# Patient Record
Sex: FEMALE | Race: ASIAN | Employment: PART TIME | URBAN - METROPOLITAN AREA
[De-identification: names, ages, dates, MRNs, and addresses within clinical notes are randomized per-mention and may not be internally consistent; named-entity substitution may affect disease eponyms.]

---

## 2020-10-14 ENCOUNTER — HOSPITAL ENCOUNTER (INPATIENT)
Age: 35
LOS: 3 days | Discharge: HOME | DRG: 663 | End: 2020-10-17
Attending: EMERGENCY MEDICINE | Admitting: EMERGENCY MEDICINE
Payer: MEDICAID

## 2020-10-14 DIAGNOSIS — D64.9 ANEMIA, UNSPECIFIED TYPE: Primary | ICD-10-CM

## 2020-10-14 LAB
ALBUMIN SERPL-MCNC: 3.6 G/DL (ref 3.5–5)
ALBUMIN/GLOB SERPL: 0.8 {RATIO} (ref 1.1–2.2)
ALP SERPL-CCNC: 90 U/L (ref 45–117)
ALT SERPL-CCNC: 45 U/L (ref 12–78)
ANION GAP SERPL CALC-SCNC: 7 MMOL/L (ref 5–15)
ANION GAP SERPL CALC-SCNC: 9 MMOL/L (ref 5–15)
APPEARANCE UR: CLEAR
APTT PPP: <20 SEC (ref 24.2–31.1)
AST SERPL W P-5'-P-CCNC: 52 U/L (ref 15–37)
BACTERIA URNS QL MICRO: NEGATIVE /HPF
BASOPHILS # BLD: 0 K/UL (ref 0–0.1)
BASOPHILS NFR BLD: 0 % (ref 0–1)
BILIRUB SERPL-MCNC: 0.6 MG/DL (ref 0.2–1)
BILIRUB UR QL: NEGATIVE
BUN SERPL-MCNC: 6 MG/DL (ref 6–20)
BUN SERPL-MCNC: 9 MG/DL (ref 6–20)
BUN/CREAT SERPL: 11 (ref 12–20)
BUN/CREAT SERPL: 17 (ref 12–20)
CA-I BLD-MCNC: 8.5 MG/DL (ref 8.5–10.1)
CA-I BLD-MCNC: 8.6 MG/DL (ref 8.5–10.1)
CHLORIDE SERPL-SCNC: 100 MMOL/L (ref 97–108)
CHLORIDE SERPL-SCNC: 106 MMOL/L (ref 97–108)
CO2 SERPL-SCNC: 25 MMOL/L (ref 21–32)
CO2 SERPL-SCNC: 26 MMOL/L (ref 21–32)
COLOR UR: ABNORMAL
CREAT SERPL-MCNC: 0.53 MG/DL (ref 0.55–1.02)
CREAT SERPL-MCNC: 0.54 MG/DL (ref 0.55–1.02)
DIFFERENTIAL METHOD BLD: ABNORMAL
EOSINOPHIL # BLD: 0 K/UL (ref 0–0.4)
EOSINOPHIL NFR BLD: 0 % (ref 0–7)
EPITH CASTS URNS QL MICRO: ABNORMAL /LPF
ERYTHROCYTE [DISTWIDTH] IN BLOOD BY AUTOMATED COUNT: 21.4 % (ref 11.5–14.5)
GLOBULIN SER CALC-MCNC: 4.3 G/DL (ref 2–4)
GLUCOSE SERPL-MCNC: 86 MG/DL (ref 65–100)
GLUCOSE SERPL-MCNC: 91 MG/DL (ref 65–100)
GLUCOSE UR STRIP.AUTO-MCNC: NEGATIVE MG/DL
HCG UR QL: NEGATIVE
HCT VFR BLD AUTO: 10.1 % (ref 35–47)
HGB BLD-MCNC: 2.5 G/DL (ref 11.5–16)
HGB UR QL STRIP: NEGATIVE
IMM GRANULOCYTES # BLD AUTO: 0 K/UL
IMM GRANULOCYTES NFR BLD AUTO: 0 %
INR PPP: 1 (ref 0.9–1.1)
KETONES UR QL STRIP.AUTO: NEGATIVE MG/DL
LEUKOCYTE ESTERASE UR QL STRIP.AUTO: NEGATIVE
LYMPHOCYTES # BLD: 1.2 K/UL (ref 0.8–3.5)
LYMPHOCYTES NFR BLD: 34 % (ref 12–49)
MCH RBC QN AUTO: 12.3 PG (ref 26–34)
MCHC RBC AUTO-ENTMCNC: 24.8 G/DL (ref 30–36.5)
MCV RBC AUTO: 49.5 FL (ref 80–99)
MONOCYTES # BLD: 0.4 K/UL (ref 0–1)
MONOCYTES NFR BLD: 11 % (ref 5–13)
NEUTS SEG # BLD: 1.8 K/UL (ref 1.8–8)
NEUTS SEG NFR BLD: 55 % (ref 32–75)
NITRITE UR QL STRIP.AUTO: NEGATIVE
PH UR STRIP: 7 [PH] (ref 5–8)
PLATELET # BLD AUTO: 405 K/UL (ref 150–400)
PMV BLD AUTO: 8.2 FL (ref 8.9–12.9)
POTASSIUM SERPL-SCNC: 3.1 MMOL/L (ref 3.5–5.1)
POTASSIUM SERPL-SCNC: 3.9 MMOL/L (ref 3.5–5.1)
PROT SERPL-MCNC: 7.9 G/DL (ref 6.4–8.2)
PROT UR STRIP-MCNC: NEGATIVE MG/DL
PROTHROMBIN TIME: 10.3 SEC (ref 9–11.1)
RBC # BLD AUTO: 2.04 M/UL (ref 3.8–5.2)
RBC #/AREA URNS HPF: ABNORMAL /HPF (ref 0–5)
RBC MORPH BLD: ABNORMAL
SODIUM SERPL-SCNC: 134 MMOL/L (ref 136–145)
SODIUM SERPL-SCNC: 139 MMOL/L (ref 136–145)
SP GR UR REFRACTOMETRY: 1 (ref 1–1.03)
THERAPEUTIC RANGE,PTTT: ABNORMAL SEC (ref 68–109)
UROBILINOGEN UR QL STRIP.AUTO: 0.1 EU/DL (ref 0.2–1)
WBC # BLD AUTO: 3.4 K/UL (ref 3.6–11)
WBC URNS QL MICRO: ABNORMAL /HPF (ref 0–4)

## 2020-10-14 PROCEDURE — 80048 BASIC METABOLIC PNL TOTAL CA: CPT

## 2020-10-14 PROCEDURE — 81001 URINALYSIS AUTO W/SCOPE: CPT

## 2020-10-14 PROCEDURE — 86923 COMPATIBILITY TEST ELECTRIC: CPT

## 2020-10-14 PROCEDURE — 36415 COLL VENOUS BLD VENIPUNCTURE: CPT

## 2020-10-14 PROCEDURE — 85610 PROTHROMBIN TIME: CPT

## 2020-10-14 PROCEDURE — 85025 COMPLETE CBC W/AUTO DIFF WBC: CPT

## 2020-10-14 PROCEDURE — 36430 TRANSFUSION BLD/BLD COMPNT: CPT

## 2020-10-14 PROCEDURE — P9016 RBC LEUKOCYTES REDUCED: HCPCS

## 2020-10-14 PROCEDURE — 65270000029 HC RM PRIVATE

## 2020-10-14 PROCEDURE — 81025 URINE PREGNANCY TEST: CPT

## 2020-10-14 PROCEDURE — 80053 COMPREHEN METABOLIC PANEL: CPT

## 2020-10-14 PROCEDURE — 86900 BLOOD TYPING SEROLOGIC ABO: CPT

## 2020-10-14 PROCEDURE — 85730 THROMBOPLASTIN TIME PARTIAL: CPT

## 2020-10-14 PROCEDURE — 99284 EMERGENCY DEPT VISIT MOD MDM: CPT

## 2020-10-14 PROCEDURE — 30233N1 TRANSFUSION OF NONAUTOLOGOUS RED BLOOD CELLS INTO PERIPHERAL VEIN, PERCUTANEOUS APPROACH: ICD-10-PCS | Performed by: INTERNAL MEDICINE

## 2020-10-14 RX ORDER — SODIUM CHLORIDE 9 MG/ML
250 INJECTION, SOLUTION INTRAVENOUS AS NEEDED
Status: DISCONTINUED | OUTPATIENT
Start: 2020-10-14 | End: 2020-10-15

## 2020-10-14 NOTE — ED TRIAGE NOTES
Pt c/o short episodes of dizziness, SOB, anxiousness that last a few minutes x1 yr. Denies any of these symptoms now, but states \"it has happened twice earlier today\". Denies CP, HA,, neuro, other associated symptoms.

## 2020-10-14 NOTE — ED PROVIDER NOTES
EMERGENCY DEPARTMENT HISTORY AND PHYSICAL EXAM      Date: 10/14/2020  Patient Name: Alison Foote      History of Presenting Illness     Chief Complaint   Patient presents with    Shortness of Breath    Dizziness       History Provided By: Patient    HPI: Alison Foote, 38 y.o. female with a past medical history significant No significant past medical history presents to the ED with cc of weakness and sob. States for the last year she has episodes where she has palpitations and dizziness which usually resolve after a period of rest.  Last 2 days, she has noted increasing symptoms and states that she has trouble walking a short distance without feeling extremely short of breath. She denies any chest pain, fever cough or cold symptoms, nausea vomiting diarrhea, melena or hematochezia. PCP: Casa Patel MD        Past History     Past Medical History: Patient endorses a history of anemia. States she required a blood transfusion twice in the past and was referred to hematology but she did not follow-up as she was afraid. No past medical history on file. Past Surgical History:  No past surgical history on file. Family History:  No family history on file. Social History:  Social History     Tobacco Use    Smoking status: Not on file   Substance Use Topics    Alcohol use: Not on file    Drug use: Not on file       Allergies:  No Known Allergies      Review of Systems     Review of Systems   Constitutional: Negative for activity change and appetite change. HENT: Negative for sore throat. Respiratory: Negative for cough. Cardiovascular: Negative for chest pain. Gastrointestinal: Negative for abdominal pain. Genitourinary: Negative for menstrual problem. Neurological: Negative for weakness and numbness. All other systems reviewed and are negative. Physical Exam     Physical Exam  Vitals signs and nursing note reviewed. Constitutional:       Appearance: Normal appearance.  She is not ill-appearing. HENT:      Head: Normocephalic and atraumatic. Nose: Nose normal.      Mouth/Throat:      Mouth: Mucous membranes are moist.      Pharynx: No oropharyngeal exudate or posterior oropharyngeal erythema. Eyes:      General: No scleral icterus. Extraocular Movements: Extraocular movements intact. Pupils: Pupils are equal, round, and reactive to light. Neck:      Musculoskeletal: Normal range of motion and neck supple. Cardiovascular:      Rate and Rhythm: Normal rate and regular rhythm. Pulses: Normal pulses. Heart sounds: Murmur present. Comments: Exam reveals regular rate and rhythm grade 2 out of 6 systolic murmur at the left sternal border  Pulmonary:      Effort: Pulmonary effort is normal.      Breath sounds: Normal breath sounds. No wheezing, rhonchi or rales. Abdominal:      General: Bowel sounds are normal.      Palpations: Abdomen is soft. Tenderness: There is no abdominal tenderness. Musculoskeletal: Normal range of motion. Right lower leg: No edema. Left lower leg: No edema. Skin:     General: Skin is warm and dry. Findings: No rash. Neurological:      General: No focal deficit present. Mental Status: She is alert and oriented to person, place, and time.    Psychiatric:         Mood and Affect: Mood normal.         Behavior: Behavior normal.         Diagnostic Study Results     Labs -     Recent Results (from the past 12 hour(s))   URINALYSIS W/MICROSCOPIC    Collection Time: 10/14/20  4:10 PM   Result Value Ref Range    Color Yellow/Straw      Appearance Clear Clear      Specific gravity 1.005 1.003 - 1.030      pH (UA) 7.0 5.0 - 8.0      Protein Negative Negative mg/dL    Glucose Negative Negative mg/dL    Ketone Negative Negative mg/dL    Bilirubin Negative Negative      Blood Negative Negative      Urobilinogen 0.1 (L) 0.2 - 1.0 EU/dL    Nitrites Negative Negative      Leukocyte Esterase Negative Negative      WBC 0-4 0 - 4 /hpf    RBC 0-5 0 - 5 /hpf    Epithelial cells Few Few /lpf    Bacteria Negative Negative /hpf   METABOLIC PANEL, COMPREHENSIVE    Collection Time: 10/14/20  4:15 PM   Result Value Ref Range    Sodium 134 (L) 136 - 145 mmol/L    Potassium 3.9 3.5 - 5.1 mmol/L    Chloride 100 97 - 108 mmol/L    CO2 25 21 - 32 mmol/L    Anion gap 9 5 - 15 mmol/L    Glucose 91 65 - 100 mg/dL    BUN 9 6 - 20 mg/dL    Creatinine 0.53 (L) 0.55 - 1.02 mg/dL    BUN/Creatinine ratio 17 12 - 20      GFR est AA >60 >60 ml/min/1.73m2    GFR est non-AA >60 >60 ml/min/1.73m2    Calcium 8.5 8.5 - 10.1 mg/dL    Bilirubin, total 0.6 0.2 - 1.0 mg/dL    AST (SGOT) 52 (H) 15 - 37 U/L    ALT (SGPT) 45 12 - 78 U/L    Alk. phosphatase 90 45 - 117 U/L    Protein, total 7.9 6.4 - 8.2 g/dL    Albumin 3.6 3.5 - 5.0 g/dL    Globulin 4.3 (H) 2.0 - 4.0 g/dL    A-G Ratio 0.8 (L) 1.1 - 2.2     PROTHROMBIN TIME + INR    Collection Time: 10/14/20  4:15 PM   Result Value Ref Range    Prothrombin time 10.3 9.0 - 11.1 sec    INR 1.0 0.9 - 1.1     PTT    Collection Time: 10/14/20  4:15 PM   Result Value Ref Range    aPTT <20.0 (L) 24.2 - 31.1 sec    aPTT, therapeutic range   68 - 109 sec   HCG URINE, QL    Collection Time: 10/14/20  4:30 PM   Result Value Ref Range    HCG urine, QL Negative Negative         Radiologic Studies -   [unfilled]  CT Results  (Last 48 hours)    None        CXR Results  (Last 48 hours)    None          Medical Decision Making and ED Course   I am the first provider for this patient. I reviewed the vital signs, available nursing notes, past medical history, past surgical history, family history and social history. Vital Signs-Reviewed the patient's vital signs. Patient Vitals for the past 12 hrs:   Temp Pulse Resp BP SpO2   10/14/20 1400 98.2 °F (36.8 °C) 98 18 (!) 147/88 100 %       EKG interpretation: (Preliminary)  Rhythm: normal sinus rhythm; and regular .  Rate (approx.): 97; Axis: normal; NH interval: normal; QRS interval: normal ; ST/T wave: normal; Other findings: normal.      Records Reviewed: Nursing Notes    The patient presents with dizziness with a differential diagnosis of  dizziness/vertigo, vasovagal episode and anemia, anxiety      Provider Notes (Medical Decision Making):     MDM  Number of Diagnoses or Management Options  Anemia, unspecified type: new, needed workup     Amount and/or Complexity of Data Reviewed  Clinical lab tests: ordered and reviewed    Complete blood count was not done on this patient at this facility as technician states the blood is too thin for the machine read to read it. A peripheral smear was noted for very few microcytic red blood cells and poikilocytes. Suspect severe anemia in this pt. ED Course:   Initial assessment performed. The patients presenting problems have been discussed, and they are in agreement with the care plan formulated and outlined with them. I have encouraged them to ask questions as they arise throughout their visit. CONSULTATIONS: Discussed with Dr. Mariana Cline who agrees to admission for this patient. Disposition       Admitted      Diagnosis     Clinical Impression:   1. Anemia, unspecified type        Attestations:    Deo Dueñas MD    Please note that this dictation was completed with [employer], the computer voice recognition software. Quite often unanticipated grammatical, syntax, homophones, and other interpretive errors are inadvertently transcribed by the computer software. Please disregard these errors. Please excuse any errors that have escaped final proofreading. Thank you.

## 2020-10-15 LAB
ALBUMIN SERPL-MCNC: 3.8 G/DL (ref 3.5–5)
ALBUMIN/GLOB SERPL: 1 {RATIO} (ref 1.1–2.2)
ALP SERPL-CCNC: 91 U/L (ref 45–117)
ALT SERPL-CCNC: 60 U/L (ref 12–78)
ANION GAP SERPL CALC-SCNC: 9 MMOL/L (ref 5–15)
AST SERPL W P-5'-P-CCNC: 63 U/L (ref 15–37)
BILIRUB SERPL-MCNC: 0.8 MG/DL (ref 0.2–1)
BUN SERPL-MCNC: 7 MG/DL (ref 6–20)
BUN/CREAT SERPL: 8 (ref 12–20)
CA-I BLD-MCNC: 8.6 MG/DL (ref 8.5–10.1)
CHLORIDE SERPL-SCNC: 106 MMOL/L (ref 97–108)
CO2 SERPL-SCNC: 24 MMOL/L (ref 21–32)
CREAT SERPL-MCNC: 0.85 MG/DL (ref 0.55–1.02)
GLOBULIN SER CALC-MCNC: 3.9 G/DL (ref 2–4)
GLUCOSE SERPL-MCNC: 102 MG/DL (ref 65–100)
HCT VFR BLD AUTO: 19.3 % (ref 35–47)
HGB BLD-MCNC: 5.6 G/DL (ref 11.5–16)
LDH SERPL L TO P-CCNC: 170 U/L (ref 81–246)
POTASSIUM SERPL-SCNC: 3.5 MMOL/L (ref 3.5–5.1)
PROT SERPL-MCNC: 7.7 G/DL (ref 6.4–8.2)
SODIUM SERPL-SCNC: 139 MMOL/L (ref 136–145)
TSH SERPL DL<=0.05 MIU/L-ACNC: 0.74 UIU/ML (ref 0.36–3.74)

## 2020-10-15 PROCEDURE — 36415 COLL VENOUS BLD VENIPUNCTURE: CPT

## 2020-10-15 PROCEDURE — 74011000258 HC RX REV CODE- 258: Performed by: INTERNAL MEDICINE

## 2020-10-15 PROCEDURE — 82746 ASSAY OF FOLIC ACID SERUM: CPT

## 2020-10-15 PROCEDURE — 82607 VITAMIN B-12: CPT

## 2020-10-15 PROCEDURE — 83615 LACTATE (LD) (LDH) ENZYME: CPT

## 2020-10-15 PROCEDURE — 74011250636 HC RX REV CODE- 250/636: Performed by: INTERNAL MEDICINE

## 2020-10-15 PROCEDURE — C9113 INJ PANTOPRAZOLE SODIUM, VIA: HCPCS | Performed by: INTERNAL MEDICINE

## 2020-10-15 PROCEDURE — 84443 ASSAY THYROID STIM HORMONE: CPT

## 2020-10-15 PROCEDURE — 65270000029 HC RM PRIVATE

## 2020-10-15 PROCEDURE — 74011250637 HC RX REV CODE- 250/637: Performed by: INTERNAL MEDICINE

## 2020-10-15 PROCEDURE — 80053 COMPREHEN METABOLIC PANEL: CPT

## 2020-10-15 PROCEDURE — 83010 ASSAY OF HAPTOGLOBIN QUANT: CPT

## 2020-10-15 PROCEDURE — P9016 RBC LEUKOCYTES REDUCED: HCPCS

## 2020-10-15 PROCEDURE — 36430 TRANSFUSION BLD/BLD COMPNT: CPT

## 2020-10-15 PROCEDURE — 83540 ASSAY OF IRON: CPT

## 2020-10-15 PROCEDURE — 85045 AUTOMATED RETICULOCYTE COUNT: CPT

## 2020-10-15 PROCEDURE — 82272 OCCULT BLD FECES 1-3 TESTS: CPT

## 2020-10-15 PROCEDURE — 85018 HEMOGLOBIN: CPT

## 2020-10-15 PROCEDURE — 82728 ASSAY OF FERRITIN: CPT

## 2020-10-15 PROCEDURE — 30233N1 TRANSFUSION OF NONAUTOLOGOUS RED BLOOD CELLS INTO PERIPHERAL VEIN, PERCUTANEOUS APPROACH: ICD-10-PCS | Performed by: INTERNAL MEDICINE

## 2020-10-15 RX ORDER — ACETAMINOPHEN 325 MG/1
650 TABLET ORAL
Status: DISCONTINUED | OUTPATIENT
Start: 2020-10-15 | End: 2020-10-17 | Stop reason: HOSPADM

## 2020-10-15 RX ORDER — SODIUM CHLORIDE 9 MG/ML
250 INJECTION, SOLUTION INTRAVENOUS AS NEEDED
Status: DISCONTINUED | OUTPATIENT
Start: 2020-10-15 | End: 2020-10-17 | Stop reason: HOSPADM

## 2020-10-15 RX ORDER — SODIUM CHLORIDE 0.9 % (FLUSH) 0.9 %
5-40 SYRINGE (ML) INJECTION EVERY 8 HOURS
Status: CANCELLED | OUTPATIENT
Start: 2020-10-15

## 2020-10-15 RX ORDER — SODIUM CHLORIDE 9 MG/ML
250 INJECTION, SOLUTION INTRAVENOUS AS NEEDED
Status: DISCONTINUED | OUTPATIENT
Start: 2020-10-15 | End: 2020-10-15

## 2020-10-15 RX ORDER — SODIUM CHLORIDE 0.9 % (FLUSH) 0.9 %
5-40 SYRINGE (ML) INJECTION AS NEEDED
Status: CANCELLED | OUTPATIENT
Start: 2020-10-15

## 2020-10-15 RX ORDER — SODIUM CHLORIDE 9 MG/ML
75 INJECTION, SOLUTION INTRAVENOUS CONTINUOUS
Status: CANCELLED | OUTPATIENT
Start: 2020-10-15

## 2020-10-15 RX ORDER — LANOLIN ALCOHOL/MO/W.PET/CERES
3 CREAM (GRAM) TOPICAL
Status: DISCONTINUED | OUTPATIENT
Start: 2020-10-15 | End: 2020-10-17 | Stop reason: HOSPADM

## 2020-10-15 RX ORDER — FUROSEMIDE 10 MG/ML
20 INJECTION INTRAMUSCULAR; INTRAVENOUS
Status: COMPLETED | OUTPATIENT
Start: 2020-10-15 | End: 2020-10-15

## 2020-10-15 RX ORDER — FUROSEMIDE 10 MG/ML
20 INJECTION INTRAMUSCULAR; INTRAVENOUS AS NEEDED
Status: COMPLETED | OUTPATIENT
Start: 2020-10-15 | End: 2020-10-16

## 2020-10-15 RX ADMIN — SODIUM CHLORIDE 8 MG/HR: 900 INJECTION INTRAVENOUS at 09:58

## 2020-10-15 RX ADMIN — ACETAMINOPHEN 650 MG: 325 TABLET, FILM COATED ORAL at 11:07

## 2020-10-15 RX ADMIN — SODIUM CHLORIDE 8 MG/HR: 900 INJECTION INTRAVENOUS at 14:20

## 2020-10-15 RX ADMIN — ACETAMINOPHEN 650 MG: 325 TABLET, FILM COATED ORAL at 03:59

## 2020-10-15 RX ADMIN — FUROSEMIDE 20 MG: 10 INJECTION, SOLUTION INTRAMUSCULAR; INTRAVENOUS at 03:59

## 2020-10-15 NOTE — PROGRESS NOTES
CM assessed patient at bedside. Patient was admitted to the hospital for dizziness and shortness of breath. Patient had an RUR score of 7%. Patient reported that she lives in a one story house with her  Hao Stinson [phone]) and her two children ages (15 and 6). Patient has no history of falling, uses no DME, and drives a car. Patient reported that she has no POA and no AMD. Patient reported that she has not had an appointment with her PCP Noman Lees [phone]) in several years.  Patient has received no rehab in the past.    CM will continue to follow patient for discharge planning needs,

## 2020-10-15 NOTE — PROGRESS NOTES
Patient received from [facility]standing ED via ambulance; resting quietly in bed; no distress noted; awaiting lab work; will continue to monitor

## 2020-10-15 NOTE — PROGRESS NOTES
Performed a 2 person skin assessment with Silviano Brower RN  Skin is intact with no issues to report.

## 2020-10-15 NOTE — PROGRESS NOTES
Spoke with Dr. Patrice Moreira concerning number of units of blood patient to receive; also obtained order for diet and medication for headache

## 2020-10-15 NOTE — H&P
[address]   History and Physical    Primary Care Provider: Yvon Kaba MD  Date of Service:  10/15/2020    Subjective:     Kylie Wells is a 38 y.o. female who presents with  dizziness onset of symptoms was gradual with unchanged course since that time. Patient denies any chest pain, denies any cough denies any chills. Had similar episode in the past which improved with patient taking a rest.  Had blood transfusion 8 years ago and had colonoscopy 8 years ago. She denies passing any black stool but occasionally passes bright red blood in stool after taking spicy food. Hemoglobin was low at 2 g  Review of Systems:    A comprehensive review of systems was negative except for that written in the History of Present Illness. No past medical history on file. No past surgical history on file. Prior to Admission medications    Not on File     No Known Allergies   No family history on file. SOCIAL HISTORY:  Patient resides at Home. Patient ambulates with no help. Smoking history: None  Alcohol history:unknown        Objective:       Physical Exam:   Visit Vitals  /69 (BP 1 Location: Left arm, BP Patient Position: At rest)   Pulse 86   Temp 98.7 °F (37.1 °C)   Resp 20   Ht 4' 11\" (1.499 m)   Wt 64.4 kg (142 lb)   SpO2 100%   BMI 28.68 kg/m²     General:  Alert, cooperative, no distress, appears stated age. appears to be pale   Head:  Normocephalic, without obvious abnormality, atraumatic. Eyes:  Conjunctivae/corneas clear. PERRL, EOMs intact. Fundi benign. Ears:  Normal TMs and external ear canals both ears. Nose: Nares normal. Septum midline. Mucosa normal. No drainage or sinus tenderness. Throat: Lips, mucosa, and tongue normal. Teeth and gums normal.   Neck: Supple, symmetrical, trachea midline, no adenopathy, thyroid: no enlargement/tenderness/nodules, no carotid bruit and no JVD. Back:   Symmetric, no curvature. ROM normal. No CVA tenderness.    Lungs:   Clear to auscultation bilaterally. Chest wall:  No tenderness or deformity. Heart:  Regular rate and rhythm, S1, S2 normal, no murmur, click, rub or gallop. Breast Exam:  No tenderness, masses, or nipple abnormality. Abdomen:   Soft, non-tender. Bowel sounds normal. No masses,  No organomegaly. Genitalia:  Normal female without lesion, discharge or tenderness. Rectal:  Normal tone,  no masses or tenderness  Guaiac negative stool. Extremities: Extremities normal, atraumatic, no cyanosis or edema. Pulses: 2+ and symmetric all extremities. Skin: Skin color, texture, turgor normal. No rashes or lesions. L   Lymph nodes: Cervical, supraclavicular, and axillary nodes normal.   Neurologic: CNII-XII intact. Normal strength, sensation and reflexes throughout. Cap refill: Brisk, less than 3 seconds  Pulses: 2+, symmetric in all extremities        Data Review: All diagnostic labs and studies have been reviewed.     Chest x-ray   No orders to display     Recent Results (from the past 24 hour(s))   URINALYSIS W/MICROSCOPIC    Collection Time: 10/14/20  4:10 PM   Result Value Ref Range    Color Yellow/Straw      Appearance Clear Clear      Specific gravity 1.005 1.003 - 1.030      pH (UA) 7.0 5.0 - 8.0      Protein Negative Negative mg/dL    Glucose Negative Negative mg/dL    Ketone Negative Negative mg/dL    Bilirubin Negative Negative      Blood Negative Negative      Urobilinogen 0.1 (L) 0.2 - 1.0 EU/dL    Nitrites Negative Negative      Leukocyte Esterase Negative Negative      WBC 0-4 0 - 4 /hpf    RBC 0-5 0 - 5 /hpf    Epithelial cells Few Few /lpf    Bacteria Negative Negative /hpf   METABOLIC PANEL, COMPREHENSIVE    Collection Time: 10/14/20  4:15 PM   Result Value Ref Range    Sodium 134 (L) 136 - 145 mmol/L    Potassium 3.9 3.5 - 5.1 mmol/L    Chloride 100 97 - 108 mmol/L    CO2 25 21 - 32 mmol/L    Anion gap 9 5 - 15 mmol/L    Glucose 91 65 - 100 mg/dL    BUN 9 6 - 20 mg/dL    Creatinine 0.53 (L) 0.55 - 1.02 mg/dL BUN/Creatinine ratio 17 12 - 20      GFR est AA >60 >60 ml/min/1.73m2    GFR est non-AA >60 >60 ml/min/1.73m2    Calcium 8.5 8.5 - 10.1 mg/dL    Bilirubin, total 0.6 0.2 - 1.0 mg/dL    AST (SGOT) 52 (H) 15 - 37 U/L    ALT (SGPT) 45 12 - 78 U/L    Alk. phosphatase 90 45 - 117 U/L    Protein, total 7.9 6.4 - 8.2 g/dL    Albumin 3.6 3.5 - 5.0 g/dL    Globulin 4.3 (H) 2.0 - 4.0 g/dL    A-G Ratio 0.8 (L) 1.1 - 2.2     PROTHROMBIN TIME + INR    Collection Time: 10/14/20  4:15 PM   Result Value Ref Range    Prothrombin time 10.3 9.0 - 11.1 sec    INR 1.0 0.9 - 1.1     PTT    Collection Time: 10/14/20  4:15 PM   Result Value Ref Range    aPTT <20.0 (L) 24.2 - 31.1 sec    aPTT, therapeutic range   68 - 109 sec   HCG URINE, QL    Collection Time: 10/14/20  4:30 PM   Result Value Ref Range    HCG urine, QL Negative Negative     TYPE & SCREEN    Collection Time: 10/14/20  9:15 PM   Result Value Ref Range    Crossmatch Expiration 10/17/2020,2359     ABO/Rh(D) O Positive     Antibody screen Negative     Unit number [id number]     Blood component type  LR,2     Unit division 00     Status of unit Allocated     TRANSFUSION STATUS Ok to transfuse     Crossmatch result Compatible     Unit number [id number]     Blood component type  LR     Unit division 00     Status of unit [address] to transfuse     Crossmatch result Compatible    CBC WITH AUTOMATED DIFF    Collection Time: 10/14/20  9:16 PM   Result Value Ref Range    WBC 3.4 (L) 3.6 - 11.0 K/uL    RBC 2.04 (L) 3.80 - 5.20 M/uL    HGB 2.5 (LL) 11.5 - 16.0 g/dL    HCT 10.1 (LL) 35.0 - 47.0 %    MCV 49.5 (L) 80.0 - 99.0 FL    MCH 12.3 (L) 26.0 - 34.0 PG    MCHC 24.8 (L) 30.0 - 36.5 g/dL    RDW 21.4 (H) 11.5 - 14.5 %    PLATELET 652 (H) 221 - 400 K/uL    MPV 8.2 (L) 8.9 - 12.9 FL    NEUTROPHILS 55 32 - 75 %    LYMPHOCYTES 34 12 - 49 %    MONOCYTES 11 5 - 13 %    EOSINOPHILS 0 0 - 7 %    BASOPHILS 0 0 - 1 %    IMMATURE GRANULOCYTES 0 %    ABS. NEUTROPHILS 1.8 1.8 - 8.0 K/UL    ABS. LYMPHOCYTES 1.2 0.8 - 3.5 K/UL    ABS. MONOCYTES 0.4 0.0 - 1.0 K/UL    ABS. EOSINOPHILS 0.0 0.0 - 0.4 K/UL    ABS. BASOPHILS 0.0 0.0 - 0.1 K/UL    ABS. IMM. GRANS. 0.0 K/UL    DF Manual      RBC COMMENTS Hypochromia  2+        RBC COMMENTS Anisocytosis  3+        RBC COMMENTS Teardrop cells  1+        RBC COMMENTS Ovalocytes  1+        RBC COMMENTS Polychromasia  1+       METABOLIC PANEL, BASIC    Collection Time: 10/14/20  9:16 PM   Result Value Ref Range    Sodium 139 136 - 145 mmol/L    Potassium 3.1 (L) 3.5 - 5.1 mmol/L    Chloride 106 97 - 108 mmol/L    CO2 26 21 - 32 mmol/L    Anion gap 7 5 - 15 mmol/L    Glucose 86 65 - 100 mg/dL    BUN 6 6 - 20 mg/dL    Creatinine 0.54 (L) 0.55 - 1.02 mg/dL    BUN/Creatinine ratio 11 (L) 12 - 20      GFR est AA >60 >60 ml/min/1.73m2    GFR est non-AA >60 >60 ml/min/1.73m2    Calcium 8.6 8.5 - 10.1 mg/dL           Assessment:     Active Problems:    Severe anemia (10/14/2020)    Acute severe anemia transfuse patient with packed red blood cells and obtain anemia work-up consult hematology  Possible GI bleed consult gastroenterology enterologist  Dizziness      Plan:     1.   Transfuse with packed red blood cells  FUNCTIONAL STATUS PRIOR TO HOSPITALIZATION Ambulates Independently     Signed By: Jose Yu MD     October 15, 2020

## 2020-10-15 NOTE — CONSULTS
Gastroenterology Consult     Referring Physician: Ravindra Posada MD     Consult Date: 10/15/2020     Subjective:     Chief Complaint: Effort intolerance and shortness of breath    History of Present Illness: Kya Jennings is a 38 y.o. female who is seen in consultation for active GI bleeding  Patient was admitted to the hospital with effort intolerance and shortness of breath    A narrative of his history is as follows   He was feeling weak and lethargic and short of breath on minimal exertion. Evaluation showed profound anemia patient says that she had rectal bleeding for a number of years blood is usually bright red in color she denies any abdominal pain associated with this bleeding. She denies any fever or chills and of course she has menstrual blood loss she does not take any blood thinners she works as a pharmacy tech. A consult was placed to evaluate for gastrointestinal blood loss. No past medical history on file. No past surgical history on file. No family history on file. Social History     Tobacco Use    Smoking status: Not on file   Substance Use Topics    Alcohol use: Not on file      No Known Allergies  Current Facility-Administered Medications   Medication Dose Route Frequency    pantoprazole (PROTONIX) 40 mg in 0.9% sodium chloride (MBP/ADV) 50 mL MBP  8 mg/hr IntraVENous CONTINUOUS    acetaminophen (TYLENOL) tablet 650 mg  650 mg Oral Q6H PRN    melatonin tablet 3 mg  3 mg Oral QHS PRN        Review of Systems:  A detailed 10 organ review of systems is obtained with pertinent positives as listed in the History of Present Illness and Past Medical History. All others are negative. Objective:     Physical Exam:  Visit Vitals  /69   Pulse 73   Temp 97.5 °F (36.4 °C)   Resp 18   Ht 4' 11\" (1.499 m)   Wt 64.4 kg (142 lb)   SpO2 98%   BMI 28.68 kg/m²        Skin:  Extremities and face reveal no rashes. No cuadra erythema. No telangiectasias on the chest wall. HEENT: Sclerae anicteric. Extra-occular muscles are intact. No oral ulcers. No abnormal pigmentation of the lips. The neck is supple. Cardiovascular: Regular rate and rhythm. No murmurs, gallops, or rubs. PMI nondisplaced. Carotids without bruits. Respiratory:  Comfortable breathing with no accessory muscle use. Clear breath sounds with no wheezes, rales, or rhonchi. GI:  Abdomen nondistended, soft, and nontender. Normal active bowel sounds. No enlargement of the liver or spleen. No masses palpable. Rectal:  Deferred  Musculoskeletal:  No pitting edema of the lower legs. Extremities have good range of motion. No costovertebral tenderness. Neurological:  Gross memory appears intact. Patient is alert and oriented. Psychiatric:  Mood appears appropriate with judgement intact. Lymphatic:  No cervical or supraclavicular adenopathy.     Lab/Data Review:  Recent Results (from the past 24 hour(s))   TYPE & SCREEN    Collection Time: 10/14/20  9:15 PM   Result Value Ref Range    Crossmatch Expiration 10/17/2020,2359     ABO/Rh(D) Fonnie Meme Positive     Antibody screen Negative     Unit number [id number]     Blood component type  LR,2     Unit division 00     Status of unit [address] to transfuse     Crossmatch result Compatible     Unit number [id number]     Blood component type  LR     Unit division 00     Status of unit [address] to transfuse     Crossmatch result Compatible    CBC WITH AUTOMATED DIFF    Collection Time: 10/14/20  9:16 PM   Result Value Ref Range    WBC 3.4 (L) 3.6 - 11.0 K/uL    RBC 2.04 (L) 3.80 - 5.20 M/uL    HGB 2.5 (LL) 11.5 - 16.0 g/dL    HCT 10.1 (LL) 35.0 - 47.0 %    MCV 49.5 (L) 80.0 - 99.0 FL    MCH 12.3 (L) 26.0 - 34.0 PG    MCHC 24.8 (L) 30.0 - 36.5 g/dL    RDW 21.4 (H) 11.5 - 14.5 %    PLATELET 222 (H) 172 - 400 K/uL    MPV 8.2 (L) 8.9 - 12.9 FL    NEUTROPHILS 55 32 - 75 %    LYMPHOCYTES 34 12 - 49 %    MONOCYTES 11 5 - 13 %    EOSINOPHILS 0 0 - 7 %    BASOPHILS 0 0 - 1 %    IMMATURE GRANULOCYTES 0 %    ABS. NEUTROPHILS 1.8 1.8 - 8.0 K/UL    ABS. LYMPHOCYTES 1.2 0.8 - 3.5 K/UL    ABS. MONOCYTES 0.4 0.0 - 1.0 K/UL    ABS. EOSINOPHILS 0.0 0.0 - 0.4 K/UL    ABS. BASOPHILS 0.0 0.0 - 0.1 K/UL    ABS. IMM. GRANS. 0.0 K/UL    DF Manual      RBC COMMENTS Hypochromia  2+        RBC COMMENTS Anisocytosis  3+        RBC COMMENTS Teardrop cells  1+        RBC COMMENTS Ovalocytes  1+        RBC COMMENTS Polychromasia  1+       METABOLIC PANEL, BASIC    Collection Time: 10/14/20  9:16 PM   Result Value Ref Range    Sodium 139 136 - 145 mmol/L    Potassium 3.1 (L) 3.5 - 5.1 mmol/L    Chloride 106 97 - 108 mmol/L    CO2 26 21 - 32 mmol/L    Anion gap 7 5 - 15 mmol/L    Glucose 86 65 - 100 mg/dL    BUN 6 6 - 20 mg/dL    Creatinine 0.54 (L) 0.55 - 1.02 mg/dL    BUN/Creatinine ratio 11 (L) 12 - 20      GFR est AA >60 >60 ml/min/1.73m2    GFR est non-AA >60 >60 ml/min/1.73m2    Calcium 8.6 8.5 - 10.1 mg/dL        No orders to display          Assessment/Plan:     Active Problems:    Severe anemia (10/14/2020)    Contrary to chronic blood loss I would recommend monitoring of hemoglobin and transfusion as necessary we will do upper endoscopy tomorrow and then perhaps a colonoscopy as an outpatient.     IP CONSULT TO HEMATOLOGY  IP CONSULT TO GASTROENTEROLOGY    Thank you for allowing me to participate in this patients care  Cc Referring Physician   Frankie Chi MD

## 2020-10-16 ENCOUNTER — ANESTHESIA EVENT (OUTPATIENT)
Dept: ENDOSCOPY | Age: 35
DRG: 663 | End: 2020-10-16
Payer: MEDICAID

## 2020-10-16 ENCOUNTER — ANESTHESIA (OUTPATIENT)
Dept: ENDOSCOPY | Age: 35
DRG: 663 | End: 2020-10-16
Payer: MEDICAID

## 2020-10-16 ENCOUNTER — APPOINTMENT (OUTPATIENT)
Dept: ENDOSCOPY | Age: 35
DRG: 663 | End: 2020-10-16
Attending: INTERNAL MEDICINE
Payer: MEDICAID

## 2020-10-16 LAB
ANION GAP SERPL CALC-SCNC: 8 MMOL/L (ref 5–15)
BASOPHILS # BLD: 0 K/UL (ref 0–0.1)
BASOPHILS NFR BLD: 0 % (ref 0–1)
BUN SERPL-MCNC: 8 MG/DL (ref 6–20)
BUN/CREAT SERPL: 12 (ref 12–20)
CA-I BLD-MCNC: 8.9 MG/DL (ref 8.5–10.1)
CHLORIDE SERPL-SCNC: 104 MMOL/L (ref 97–108)
CO2 SERPL-SCNC: 25 MMOL/L (ref 21–32)
COLLECT DATE STL: NORMAL
CREAT SERPL-MCNC: 0.69 MG/DL (ref 0.55–1.02)
DIFFERENTIAL METHOD BLD: ABNORMAL
EOSINOPHIL # BLD: 0 K/UL (ref 0–0.4)
EOSINOPHIL NFR BLD: 0 % (ref 0–7)
FERRITIN SERPL-MCNC: 4 NG/ML (ref 26–388)
FOLATE SERPL-MCNC: 5.4 NG/ML (ref 5–21)
GLUCOSE SERPL-MCNC: 90 MG/DL (ref 65–100)
HAPTOGLOB SERPL-MCNC: 136 MG/DL (ref 30–200)
HCT VFR BLD AUTO: 29.7 % (ref 35–47)
HCT VFR BLD AUTO: 31.4 % (ref 35–47)
HEMOCCULT SP1 STL QL: NEGATIVE
HGB BLD-MCNC: 9.4 G/DL (ref 11.5–16)
HGB BLD-MCNC: 9.8 G/DL (ref 11.5–16)
IMM GRANULOCYTES # BLD AUTO: 0 K/UL (ref 0–0.04)
IMM GRANULOCYTES NFR BLD AUTO: 0 % (ref 0–0.5)
IRON SATN MFR SERPL: 3 % (ref 20–50)
IRON SERPL-MCNC: 17 UG/DL (ref 35–150)
LYMPHOCYTES # BLD: 0.7 K/UL (ref 0.8–3.5)
LYMPHOCYTES NFR BLD: 14 % (ref 12–49)
MCH RBC QN AUTO: 21.6 PG (ref 26–34)
MCHC RBC AUTO-ENTMCNC: 31.6 G/DL (ref 30–36.5)
MCV RBC AUTO: 68.3 FL (ref 80–99)
MONOCYTES # BLD: 0.3 K/UL (ref 0–1)
MONOCYTES NFR BLD: 6 % (ref 5–13)
NEUTS SEG # BLD: 4.2 K/UL (ref 1.8–8)
NEUTS SEG NFR BLD: 80 % (ref 32–75)
NRBC # BLD: 0.05 K/UL
NRBC # BLD: 0.05 K/UL (ref 0–0.01)
NRBC BLD MANUAL-RTO: 1 PER 100 WBC
NRBC BLD-RTO: 0.9 PER 100 WBC
PLATELET # BLD AUTO: 486 K/UL (ref 150–400)
POTASSIUM SERPL-SCNC: 3.1 MMOL/L (ref 3.5–5.1)
RBC # BLD AUTO: 4.35 M/UL (ref 3.8–5.2)
RBC MORPH BLD: ABNORMAL
SODIUM SERPL-SCNC: 137 MMOL/L (ref 136–145)
TIBC SERPL-MCNC: 514 UG/DL (ref 250–450)
VIT B12 SERPL-MCNC: 253 PG/ML (ref 193–986)
WBC # BLD AUTO: 5.2 K/UL (ref 3.6–11)

## 2020-10-16 PROCEDURE — 30233N1 TRANSFUSION OF NONAUTOLOGOUS RED BLOOD CELLS INTO PERIPHERAL VEIN, PERCUTANEOUS APPROACH: ICD-10-PCS | Performed by: INTERNAL MEDICINE

## 2020-10-16 PROCEDURE — 74011250636 HC RX REV CODE- 250/636: Performed by: NURSE ANESTHETIST, CERTIFIED REGISTERED

## 2020-10-16 PROCEDURE — 76060000033 HC ANESTHESIA 1 TO 1.5 HR: Performed by: INTERNAL MEDICINE

## 2020-10-16 PROCEDURE — 74011250637 HC RX REV CODE- 250/637: Performed by: INTERNAL MEDICINE

## 2020-10-16 PROCEDURE — P9016 RBC LEUKOCYTES REDUCED: HCPCS

## 2020-10-16 PROCEDURE — 36430 TRANSFUSION BLD/BLD COMPNT: CPT

## 2020-10-16 PROCEDURE — 74011250636 HC RX REV CODE- 250/636: Performed by: INTERNAL MEDICINE

## 2020-10-16 PROCEDURE — 36415 COLL VENOUS BLD VENIPUNCTURE: CPT

## 2020-10-16 PROCEDURE — 85018 HEMOGLOBIN: CPT

## 2020-10-16 PROCEDURE — 76040000008: Performed by: INTERNAL MEDICINE

## 2020-10-16 PROCEDURE — 74011000258 HC RX REV CODE- 258: Performed by: INTERNAL MEDICINE

## 2020-10-16 PROCEDURE — 80048 BASIC METABOLIC PNL TOTAL CA: CPT

## 2020-10-16 PROCEDURE — 65270000029 HC RM PRIVATE

## 2020-10-16 PROCEDURE — 74011000250 HC RX REV CODE- 250: Performed by: NURSE ANESTHETIST, CERTIFIED REGISTERED

## 2020-10-16 PROCEDURE — 0DB98ZX EXCISION OF DUODENUM, VIA NATURAL OR ARTIFICIAL OPENING ENDOSCOPIC, DIAGNOSTIC: ICD-10-PCS | Performed by: INTERNAL MEDICINE

## 2020-10-16 PROCEDURE — 0DB48ZX EXCISION OF ESOPHAGOGASTRIC JUNCTION, VIA NATURAL OR ARTIFICIAL OPENING ENDOSCOPIC, DIAGNOSTIC: ICD-10-PCS | Performed by: INTERNAL MEDICINE

## 2020-10-16 PROCEDURE — C9113 INJ PANTOPRAZOLE SODIUM, VIA: HCPCS | Performed by: INTERNAL MEDICINE

## 2020-10-16 PROCEDURE — 77030009426 HC FCPS BIOP ENDOSC BSC -B: Performed by: INTERNAL MEDICINE

## 2020-10-16 PROCEDURE — 85025 COMPLETE CBC W/AUTO DIFF WBC: CPT

## 2020-10-16 PROCEDURE — 88305 TISSUE EXAM BY PATHOLOGIST: CPT

## 2020-10-16 RX ORDER — PANTOPRAZOLE SODIUM 40 MG/1
40 TABLET, DELAYED RELEASE ORAL 2 TIMES DAILY
Status: DISCONTINUED | OUTPATIENT
Start: 2020-10-16 | End: 2020-10-17 | Stop reason: HOSPADM

## 2020-10-16 RX ORDER — PROPOFOL 10 MG/ML
INJECTION, EMULSION INTRAVENOUS AS NEEDED
Status: DISCONTINUED | OUTPATIENT
Start: 2020-10-16 | End: 2020-10-16 | Stop reason: HOSPADM

## 2020-10-16 RX ORDER — SODIUM CHLORIDE, SODIUM LACTATE, POTASSIUM CHLORIDE, CALCIUM CHLORIDE 600; 310; 30; 20 MG/100ML; MG/100ML; MG/100ML; MG/100ML
INJECTION, SOLUTION INTRAVENOUS
Status: DISCONTINUED | OUTPATIENT
Start: 2020-10-16 | End: 2020-10-16 | Stop reason: HOSPADM

## 2020-10-16 RX ORDER — PROPOFOL 10 MG/ML
INJECTION, EMULSION INTRAVENOUS
Status: COMPLETED
Start: 2020-10-16 | End: 2020-10-16

## 2020-10-16 RX ORDER — LIDOCAINE HYDROCHLORIDE 20 MG/ML
INJECTION, SOLUTION EPIDURAL; INFILTRATION; INTRACAUDAL; PERINEURAL
Status: COMPLETED
Start: 2020-10-16 | End: 2020-10-16

## 2020-10-16 RX ORDER — LIDOCAINE HYDROCHLORIDE 20 MG/ML
INJECTION, SOLUTION EPIDURAL; INFILTRATION; INTRACAUDAL; PERINEURAL AS NEEDED
Status: DISCONTINUED | OUTPATIENT
Start: 2020-10-16 | End: 2020-10-16 | Stop reason: HOSPADM

## 2020-10-16 RX ADMIN — PANTOPRAZOLE SODIUM 40 MG: 40 TABLET, DELAYED RELEASE ORAL at 11:42

## 2020-10-16 RX ADMIN — PANTOPRAZOLE SODIUM 40 MG: 40 TABLET, DELAYED RELEASE ORAL at 20:59

## 2020-10-16 RX ADMIN — PROPOFOL 20 MG: 10 INJECTION, EMULSION INTRAVENOUS at 10:43

## 2020-10-16 RX ADMIN — LIDOCAINE HYDROCHLORIDE 100 MG: 20 INJECTION, SOLUTION EPIDURAL; INFILTRATION; INTRACAUDAL; PERINEURAL at 10:39

## 2020-10-16 RX ADMIN — FUROSEMIDE 20 MG: 10 INJECTION, SOLUTION INTRAMUSCULAR; INTRAVENOUS at 07:29

## 2020-10-16 RX ADMIN — SODIUM CHLORIDE 8 MG/HR: 900 INJECTION INTRAVENOUS at 07:53

## 2020-10-16 RX ADMIN — FUROSEMIDE 20 MG: 10 INJECTION, SOLUTION INTRAMUSCULAR; INTRAVENOUS at 02:23

## 2020-10-16 RX ADMIN — SODIUM CHLORIDE, POTASSIUM CHLORIDE, SODIUM LACTATE AND CALCIUM CHLORIDE: 600; 310; 30; 20 INJECTION, SOLUTION INTRAVENOUS at 09:47

## 2020-10-16 RX ADMIN — SODIUM CHLORIDE 125 MG: 0.9 INJECTION INTRAVENOUS at 18:06

## 2020-10-16 RX ADMIN — PROPOFOL 100 MG: 10 INJECTION, EMULSION INTRAVENOUS at 10:39

## 2020-10-16 NOTE — ANESTHESIA PREPROCEDURE EVALUATION
Relevant Problems   No relevant active problems       Anesthetic History   No history of anesthetic complications            Review of Systems / Medical History  Patient summary reviewed, nursing notes reviewed and pertinent labs reviewed    Pulmonary  Within defined limits                 Neuro/Psych   Within defined limits           Cardiovascular  Within defined limits                Exercise tolerance: >4 METS     GI/Hepatic/Renal  Within defined limits              Endo/Other        Anemia    Comments: Severe anemia, 2.5 now 5.6. Consider Thalassemia.  Other Findings            Physical Exam    Airway  Mallampati: I  TM Distance: 4 - 6 cm  Neck ROM: normal range of motion   Mouth opening: Normal     Cardiovascular    Rhythm: regular  Rate: normal         Dental  No notable dental hx       Pulmonary  Breath sounds clear to auscultation               Abdominal  GI exam deferred       Other Findings            Anesthetic Plan    ASA: 3  Anesthesia type: general and total IV anesthesia          Induction: Intravenous  Anesthetic plan and risks discussed with: Patient

## 2020-10-16 NOTE — CONSULTS
Hematology/Oncology Consult    Patient: Vicente Becker MRN: [id number]     YOB: 1985  Age: 38 y.o. Sex: female      HPI      Chief Complaint   Patient presents with    Shortness of Breath    Dizziness       Vicente Becker is a 38 y.o. female who is being seen for severe anemia, hemoglobin of 2 g/dL. She presented yesterday after labs at the routine medical checkup showed severe anemia and was sent to the emergency room. Patient reports having problems with dizziness lightheadedness, chest pain and shortness of breath episodes of palpitations that were progressively getting worse for the past 4 months. She had a similar problems about 4 years ago in 2016 when she needed packed red blood cell transfusions at that time she was found to have anemic When she presented to the ER with cholecystitis and required cholecystectomy. Patient reports since that time her appetite steadily declined and she lost close to 75 pounds in these 4 years. She admits to having intermittent rectal bleeding. She reports her menstrual cycles are fairly regular she does not think the cycles are heavy. The bleeding lasts for 4 days and is usual and moderate for her. Denies having any epistaxis has some gum bleeding. Does not take any vitamins or supplements at home. She denies having any smoking or alcohol use. She works as a pharmacy tech. .    No past medical history on file. No past surgical history on file. She had a cholecystectomy in the past  No family history on file.   She denied having any major medical problems with her family  Social History     Tobacco Use    Smoking status: Not on file   Substance Use Topics    Alcohol use: Not on file      Current Facility-Administered Medications   Medication Dose Route Frequency Provider Last Rate Last Dose    pantoprazole (PROTONIX) tablet 40 mg  40 mg Oral BID Harriett Llanes MD   40 mg at 10/16/20 1142    ferric gluconate (FERRLECIT) 125 mg in 0.9% sodium chloride 100 mL IVPB  125 mg IntraVENous DAILY Husam Cabezas MD        acetaminophen (TYLENOL) tablet 650 mg  650 mg Oral Q6H PRN Jose CONNORS MD   650 mg at 10/15/20 1107    melatonin tablet 3 mg  3 mg Oral QHS PRN Jose CONNORS MD        0.9% sodium chloride infusion 250 mL  250 mL IntraVENous PRN Jose CONNORS MD            No Known Allergies    Review of Systems:  Constitutional she did have excessive fatigue and weight loss. Denies having any fevers or chills reports having exertional shortness of breath and palpitations   Allergic/Immunologic    Eyes No significant visual difficulties. No diplopia. ENMT No problems with hearing, no sore throat, no sinus drainage. Endocrine No hot flashes or night sweats. No cold intolerance, polyuria, or polydipsia   Hematologic/Lymphatic No easy bruising or bleeding. The patient denies any tender or palpable lymph nodes   Breasts No abnormal masses of breast, nipple discharge or pain. Respiratory No dyspnea on exertion, orthopnea, chest pain, cough or hemoptysis. Cardiovascular  she reports having exertional shortness of breath and palpitations   Gastrointestinal No nausea, vomiting, diarrhea, constipation, cramping, dysphagia, reflux, heartburn, she has bleeding from her hemorrhoids,, or early satiety. No change in bowel habits. Genitourinary (M) No hematuria, dysuria, increased frequency, urgency, hesitancy or incontinence. Musculoskeletal No joint pain, swelling or redness. No decreased range of motion. Integumentary No chronic rashes, inflammation, ulcerations, pruritus, petechiae, purpura, ecchymoses, or skin changes. Neurologic No headache, blurred vision, and no areas of focal weakness or numbness. Normal gait. No sensory problems. Psychiatric No insomnia, depression, aisha or mood swings. No psychotropic drugs.          Objective:     Vitals:    10/16/20 1044 10/16/20 1054 10/16/20 1100 10/16/20 1228   BP: 102/63 116/77 118/84 (!) 130/90   Pulse: 69 72 71 82   Resp: 17 15 12 16   Temp: 97.2 °F (36.2 °C)   97.9 °F (36.6 °C)   SpO2: 100% 100% 99% 98%   Weight:       Height:            Physical Exam:  Constitutional Alert, cooperative, oriented. Mood and affect appropriate. Appears close to chronological age. Astrid Ellsworthls female and appears her stated age   Head Normocephalic; no scars   Eyes  pallor is present no icterus   ENMT Sinuses are nontender. No oral exudates, ulcers, masses, thrush or mucositis. Oropharynx clear. Tongue normal.   Neck Supple without masses or thyromegaly. No jugular venous distension. Hematologic/Lymphatic No petechiae or purpura. No tender or palpable lymph nodes in the cervical, supraclavicular, axillary or inguinal area. Respiratory Lungs are clear to auscultation without rhonchi or wheezing. Cardiovascular Regular rate and rhythm of heart without murmurs, gallops or rubs. Chest / Line Site Chest is symmetric with no chest wall deformities. Abdomen Non-tender, non-distended, no masses, ascites or hepatosplenomegaly. Good bowel sounds. No guarding or rebound tenderness. No pulsatile masses. Musculoskeletal No tenderness or swelling, normal range of motion without obvious weakness. Extremities No visible deformities, no cyanosis, clubbing or edema. Skin No rashes, scars, or lesions suggestive of malignancy. No petechiae, purpura, or ecchymoses. No excoriations. Neurologic No sensory or motor deficits, normal cerebellar function, normal gait, cranial nerves intact. Psychiatric Alert and oriented times three. Coherent speech. Verbalizes understanding of our discussions today.      Lab/Data Review:  Recent Labs     10/15/20  0115 10/14/20  2116   WBC  --  3.4*   HGB 5.6* 2.5*   HCT 19.3* 10.1*   PLT  --  405*     Recent Labs     10/15/20  0115 10/14/20  2116 10/14/20  1615    139 134*   K 3.5 3.1* 3.9    106 100   CO2 24 26 25   * 86 91   BUN 7 6 9   CREA 0.85 0.54* 0.53*   CA 8.6 8.6 8.5   ALB 3.8  --  3.6   TBILI 0.8  --  0.6   ALT 60  --  45   INR  --   --  1.0     No results for input(s): PH, PCO2, PO2, HCO3, FIO2 in the last 72 hours. No results found for this or any previous visit (from the past 24 hour(s)). No results found. Assessment and Plan:     Hospital Problems  Never Reviewed          Codes Class Noted POA    Severe anemia ICD-10-CM: D64.9  ICD-9-CM: 285.9  10/14/2020 Unknown            1. Severe microcytic anemia; admitted with a hemoglobin of 2.5 g/dL with marked microcytosis of 49.5.  -She received 4 units of packed red blood cell transfusion so far. After 2 units her hemoglobin improved to 5.6 and repeat CBC after the fourth unit is pending.    -Work-up is consistent with marked iron deficiency with a ferritin of 4, iron saturation of 3% TIBC of 514.   -Her B12 is also borderline.  -Recommended her to receive IV iron infusions. Ferlicit 2 doses ordered today. Give a dose  tonight another dose tomorrow. Pros and cons of IV iron discussed with patient including the risk of infusion reactions.  -Also will start her on L83 and folic acid orally  -Work-up ordered to rule out concomitant causes of anemia.  -We will review her blood smear. 2) she is already seen by gastroenterology and  Underwent an EGD this morning  -Result is pending  - Dr. Rommel Givens plans to do an outpatient colonoscopy next week. Recommended her to receive IV iron infusions before being discharged home tomorrow. All her questions were answered.     Signed By: Kameron Fish MD     October 16, 2020

## 2020-10-16 NOTE — PROGRESS NOTES
Hospitalist Progress Note            Date of Service:  10/16/2020  NAME:  Mima Oppenheim  :  1985  MRN:  [id number]      Admission Summary:   38years old patient will presented with a complaint of dizziness and emergency department hemoglobin of 2 g. She denied passing any melena stool patient had endoscopy today had multiple blood transfusion. She wants to go home. I discussed with hematologist about evaluation and GI endoscopy results are pending    Interval history / Subjective:   Posttransfusion CBC is not available     Assessment & Plan:     Acute severe anemia symptomatic patient had multiple blood transfusions and endoscopy results pending and hematology evaluation is pending discussed this in detail with the patient  Dizziness  Near syncope  Iron deficiency  Code status: full  DVT prophylaxis:mechanical    Care Plan discussed with: Patient/Family  Anticipated Disposition: Home w/Family  Anticipated Discharge: 24 hours to 48 hours     Hospital Problems  Never Reviewed          Codes Class Noted POA    Severe anemia ICD-10-CM: D64.9  ICD-9-CM: 285.9  10/14/2020 Unknown                Review of Systems:   A comprehensive review of systems was negative except for that written in the HPI. Vital Signs:    Last 24hrs VS reviewed since prior progress note. Most recent are:  Visit Vitals  BP (!) 130/90 (BP 1 Location: Right arm, BP Patient Position: At rest;Sitting)   Pulse 82   Temp 97.9 °F (36.6 °C)   Resp 16   Ht 4' 11\" (1.499 m)   Wt 64.4 kg (142 lb)   SpO2 98%   BMI 28.68 kg/m²         Intake/Output Summary (Last 24 hours) at 10/16/2020 1344  Last data filed at 10/16/2020 1044  Gross per 24 hour   Intake 814 ml   Output    Net 814 ml        Physical Examination:             Constitutional:  No acute distress, cooperative, pleasant    ENT:  Oral mucosa moist, oropharynx benign. Resp:  CTA bilaterally.  No wheezing/rhonchi/rales. No accessory muscle use   CV:  Regular rhythm, normal rate, no murmurs, gallops, rubs    GI:  Soft, non distended, non tender. normoactive bowel sounds, no hepatosplenomegaly     Musculoskeletal:  No edema, warm, 2+ pulses throughout    Neurologic:  Moves all extremities. AAOx3, CN II-XII reviewed     Psych:  Good insight, Not anxious nor agitated. Skin:  Good turgor, no rashes or ulcers  Hematologic/Lymphatic/Immunlogic:  No jaundice nor lymph node swelling  Eyes:  EOMI. Anicteric sclerae, PERRL. Data Review:    Review and/or order of clinical lab test      Labs:     Recent Labs     10/15/20  0115 10/14/20  2116   WBC  --  3.4*   HGB 5.6* 2.5*   HCT 19.3* 10.1*   PLT  --  405*     Recent Labs     10/15/20  0115 10/14/20  2116 10/14/20  1615    139 134*   K 3.5 3.1* 3.9    106 100   CO2 24 26 25   BUN 7 6 9   CREA 0.85 0.54* 0.53*   * 86 91   CA 8.6 8.6 8.5     Recent Labs     10/15/20  0115 10/14/20  1615   ALT 60 45   AP 91 90   TBILI 0.8 0.6   TP 7.7 7.9   ALB 3.8 3.6   GLOB 3.9 4.3*     Recent Labs     10/14/20  1615   INR 1.0   PTP 10.3   APTT <20.0*      Recent Labs     10/15/20  0115   TIBC 514*   PSAT 3*   FERR 4*      Lab Results   Component Value Date/Time    Folate 5.4 10/15/2020 01:15 AM      No results for input(s): PH, PCO2, PO2 in the last 72 hours. No results for input(s): CPK, CKNDX, TROIQ in the last 72 hours.     No lab exists for component: CPKMB  No results found for: CHOL, CHOLX, CHLST, CHOLV, HDL, HDLP, LDL, LDLC, DLDLP, TGLX, TRIGL, TRIGP, CHHD, CHHDX  No results found for: [facility]  Lab Results   Component Value Date/Time    Color Yellow/Straw 10/14/2020 04:10 PM    Appearance Clear 10/14/2020 04:10 PM    Specific gravity 1.005 10/14/2020 04:10 PM    pH (UA) 7.0 10/14/2020 04:10 PM    Protein Negative 10/14/2020 04:10 PM    Glucose Negative 10/14/2020 04:10 PM    Ketone Negative 10/14/2020 04:10 PM    Bilirubin Negative 10/14/2020 04:10 PM Urobilinogen 0.1 (L) 10/14/2020 04:10 PM    Nitrites Negative 10/14/2020 04:10 PM    Leukocyte Esterase Negative 10/14/2020 04:10 PM    Epithelial cells Few 10/14/2020 04:10 PM    Bacteria Negative 10/14/2020 04:10 PM    WBC 0-4 10/14/2020 04:10 PM    RBC 0-5 10/14/2020 04:10 PM         Medications Reviewed:     Current Facility-Administered Medications   Medication Dose Route Frequency    pantoprazole (PROTONIX) tablet 40 mg  40 mg Oral BID    acetaminophen (TYLENOL) tablet 650 mg  650 mg Oral Q6H PRN    melatonin tablet 3 mg  3 mg Oral QHS PRN    0.9% sodium chloride infusion 250 mL  250 mL IntraVENous PRN     ______________________________________________________________________  EXPECTED LENGTH OF STAY: - - -  ACTUAL LENGTH OF STAY:          2                 Papi Galicia MD

## 2020-10-17 VITALS
WEIGHT: 142 LBS | SYSTOLIC BLOOD PRESSURE: 116 MMHG | BODY MASS INDEX: 28.63 KG/M2 | HEART RATE: 82 BPM | OXYGEN SATURATION: 98 % | HEIGHT: 59 IN | RESPIRATION RATE: 18 BRPM | TEMPERATURE: 98.3 F | DIASTOLIC BLOOD PRESSURE: 70 MMHG

## 2020-10-17 LAB
ABO + RH BLD: NORMAL
BASOPHILS # BLD: 0 K/UL (ref 0–0.1)
BASOPHILS NFR BLD: 0 % (ref 0–1)
BLD PROD TYP BPU: NORMAL
BLOOD GROUP ANTIBODIES SERPL: NEGATIVE
BPU ID: NORMAL
CROSSMATCH RESULT,%XM: NORMAL
DIFFERENTIAL METHOD BLD: ABNORMAL
EOSINOPHIL # BLD: 0 K/UL (ref 0–0.4)
EOSINOPHIL NFR BLD: 1 % (ref 0–7)
HCT VFR BLD AUTO: 28.4 % (ref 35–47)
HGB BLD-MCNC: 9 G/DL (ref 11.5–16)
IMM GRANULOCYTES # BLD AUTO: 0 K/UL (ref 0–0.04)
IMM GRANULOCYTES NFR BLD AUTO: 1 % (ref 0–0.5)
LYMPHOCYTES # BLD: 1.2 K/UL (ref 0.8–3.5)
LYMPHOCYTES NFR BLD: 22 % (ref 12–49)
MCH RBC QN AUTO: 21.7 PG (ref 26–34)
MCHC RBC AUTO-ENTMCNC: 31.7 G/DL (ref 30–36.5)
MCV RBC AUTO: 68.6 FL (ref 80–99)
MONOCYTES # BLD: 0.5 K/UL (ref 0–1)
MONOCYTES NFR BLD: 9 % (ref 5–13)
NEUTS SEG # BLD: 3.6 K/UL (ref 1.8–8)
NEUTS SEG NFR BLD: 67 % (ref 32–75)
PLATELET # BLD AUTO: 356 K/UL (ref 150–400)
RBC # BLD AUTO: 4.14 M/UL (ref 3.8–5.2)
SPECIMEN EXP DATE BLD: NORMAL
STATUS OF UNIT,%ST: NORMAL
TRANSFUSION STATUS PATIENT QL: NORMAL
UNIT DIVISION, %UDIV: 0
WBC # BLD AUTO: 5.3 K/UL (ref 3.6–11)

## 2020-10-17 PROCEDURE — 36415 COLL VENOUS BLD VENIPUNCTURE: CPT

## 2020-10-17 PROCEDURE — 94760 N-INVAS EAR/PLS OXIMETRY 1: CPT

## 2020-10-17 PROCEDURE — 85025 COMPLETE CBC W/AUTO DIFF WBC: CPT

## 2020-10-17 PROCEDURE — 74011250637 HC RX REV CODE- 250/637: Performed by: INTERNAL MEDICINE

## 2020-10-17 RX ORDER — LANOLIN ALCOHOL/MO/W.PET/CERES
1000 CREAM (GRAM) TOPICAL DAILY
Qty: 30 TAB | Refills: 0 | Status: SHIPPED | OUTPATIENT
Start: 2020-10-17

## 2020-10-17 RX ORDER — PANTOPRAZOLE SODIUM 40 MG/1
40 TABLET, DELAYED RELEASE ORAL 2 TIMES DAILY
Qty: 30 TAB | Refills: 0 | Status: SHIPPED | OUTPATIENT
Start: 2020-10-17

## 2020-10-17 RX ORDER — LANOLIN ALCOHOL/MO/W.PET/CERES
3 CREAM (GRAM) TOPICAL
Qty: 30 TAB | Refills: 0 | Status: SHIPPED | OUTPATIENT
Start: 2020-10-17

## 2020-10-17 RX ORDER — NYSTATIN 100000 U/G
CREAM TOPICAL 2 TIMES DAILY
Qty: 15 G | Refills: 0 | Status: SHIPPED | OUTPATIENT
Start: 2020-10-17

## 2020-10-17 RX ORDER — FOLIC ACID 1 MG/1
1 TABLET ORAL DAILY
Qty: 30 TAB | Refills: 0 | Status: SHIPPED | OUTPATIENT
Start: 2020-10-17

## 2020-10-17 RX ADMIN — PANTOPRAZOLE SODIUM 40 MG: 40 TABLET, DELAYED RELEASE ORAL at 09:18

## 2020-10-17 NOTE — PROGRESS NOTES
D/C Plan:    -d/c home  -PCP follow up      Patient scheduled to d/c home today. Aware to follow up w/PCP for this inpatient admission. Patient is clear to discharge from SW standpoint.       Pilar Minor, MSW

## 2020-10-17 NOTE — PROGRESS NOTES
Hematology Oncology Progress Note     Interval History :  She tolerated IV iron well without any problems yesterday. Hemoglobin this morning is 9 g/dL and she wants to be discharged home. Subjective: Othelia Bryant Pruritic lesions under the breast and axilla  Denies having any dizziness chest pain shortness of breath today. Current Facility-Administered Medications   Medication Dose Route Frequency    pantoprazole (PROTONIX) tablet 40 mg  40 mg Oral BID    ferric gluconate (FERRLECIT) 125 mg in 0.9% sodium chloride 100 mL IVPB  125 mg IntraVENous DAILY    acetaminophen (TYLENOL) tablet 650 mg  650 mg Oral Q6H PRN    melatonin tablet 3 mg  3 mg Oral QHS PRN    0.9% sodium chloride infusion 250 mL  250 mL IntraVENous PRN        Review of Systems:    Constitutional No fevers, chills, night sweats, excessive fatigue or weight loss. Allergic/Immunologic No recent allergic reactions   Eyes No significant visual difficulties. No diplopia. ENMT No problems with hearing, no sore throat, no sinus drainage. Endocrine No hot flashes or night sweats. No cold intolerance, polyuria, or polydipsia   Hematologic/Lymphatic No easy bruising or bleeding. The patient denies any tender or palpable lymph nodes   Breasts No abnormal masses of breast, nipple discharge or pain. Respiratory No dyspnea on exertion, orthopnea, chest pain, cough or hemoptysis. Cardiovascular No anginal chest pain, irregular heart beat, tachycardia, palpitations or orthopnea. Gastrointestinal No nausea, vomiting, diarrhea, constipation, cramping, dysphagia, reflux, heartburn, GI bleeding, or early satiety. No change in bowel habits. Genitourinary (M) No hematuria, dysuria, increased frequency, urgency, hesitancy or incontinence. Musculoskeletal No joint pain, swelling or redness. No decreased range of motion. Integumentary No chronic rashes, inflammation, ulcerations, pruritus, petechiae, purpura, ecchymoses, or skin changes.    Neurologic No headache, blurred vision, and no areas of focal weakness or numbness. Normal gait. No sensory problems. Psychiatric No insomnia, depression, aisha or mood swings. No psychotropic drugs. Objective:     Patient Vitals for the past 8 hrs:   BP Temp Pulse Resp SpO2   10/17/20 1240 116/70 98.3 °F (36.8 °C) 82 18 98 %   10/17/20 1234     98 %   10/17/20 0745 127/81 98.2 °F (36.8 °C) 70 18 100 %       Temp (24hrs), Av.2 °F (36.8 °C), Min:98.1 °F (36.7 °C), Max:98.4 °F (36.9 °C)      Physical Exam:    Constitutional  young female appears her stated age. Mood and affect appropriate. Appears close to chronological age. Well nourished. Well developed. Head Normocephalic; no scars   Eyes  pallor is present no icterus   ENMT Sinuses are nontender. No oral exudates, ulcers, masses, thrush or mucositis. Oropharynx clear. Tongue normal.   Neck Supple without masses or thyromegaly. No jugular venous distension. Hematologic/Lymphatic No petechiae or purpura. No tender or palpable lymph nodes in the cervical, supraclavicular, axillary or inguinal area. Respiratory Lungs are clear to auscultation without rhonchi or wheezing. Cardiovascular Regular rate and rhythm of heart without murmurs, gallops or rubs. Chest / Line Site Chest is symmetric with no chest wall deformities. Abdomen Non-tender, non-distended, no masses, ascites or hepatosplenomegaly. Good bowel sounds. No guarding or rebound tenderness. No pulsatile masses. Musculoskeletal No tenderness or swelling, normal range of motion without obvious weakness. Extremities No visible deformities, no cyanosis, clubbing or edema. Skin No rashes, scars, or lesions suggestive of malignancy. No petechiae, purpura, or ecchymoses. No excoriations. Neurologic No sensory or motor deficits, normal cerebellar function, normal gait, cranial nerves intact. Psychiatric Alert and oriented times three. Coherent speech.  Verbalizes understanding of our discussions today. Lab/Data Review:  Recent Labs     10/17/20  0126 10/16/20  2038 10/16/20  1545  10/14/20  2116   WBC 5.3 5.2  --   --  3.4*   HGB 9.0* 9.4* 9.8*   < > 2.5*   HCT 28.4* 29.7* 31.4*   < > 10.1*    486*  --   --  405*    < > = values in this interval not displayed. Recent Labs     10/16/20  2038 10/15/20  0115 10/14/20  2116 10/14/20  1615    139 139 134*   K 3.1* 3.5 3.1* 3.9    106 106 100   CO2 25 24 26 25   GLU 90 102* 86 91   BUN 8 7 6 9   CREA 0.69 0.85 0.54* 0.53*   CA 8.9 8.6 8.6 8.5   ALB  --  3.8  --  3.6   TBILI  --  0.8  --  0.6   ALT  --  60  --  45   INR  --   --   --  1.0     No results for input(s): PH, PCO2, PO2, HCO3, FIO2 in the last 72 hours. Radiology:   No results found. Assessment /Plan:     Active Problems:    Severe anemia (10/14/2020)       1. Severe microcytic anemia; admitted with a hemoglobin of 2.5 g/dL with marked microcytosis of 49.5.  -She received 4 units of packed red blood cell transfusion so far. After 2 units her hemoglobin improved to 5.6 and repeat CBC after the fourth unit is 9 gm/dl.     -Work-up is consistent with marked iron deficiency with a ferritin of 4, iron saturation of 3% TIBC of 514.   -Her B12 is also borderline.  -She received a dose of  Ferlicit  yesterday and tolerated it well. She would like to go home and continue iron infusions as an outpatient. Her hemoglobin improved to 9 g/dL and she is feeling better. Okay to discharge home from heme-onc standpoint. Recommended her to take oral iron, C41 1 mg and folic acid 1 mg as outpatient  -Work-up ordered to rule out concomitant causes of anemia.   The results of which are pending will follow with her as outpatient       2) she is already seen by gastroenterology and  Underwent an EGD this morning  -Result is pending  - Dr. Li Medicine plans to do an outpatient colonoscopy next week.     Recommended follow-up visit in the office in about 2 to 4 weeks.        Prince Field MD  10/17/2020

## 2020-10-17 NOTE — DISCHARGE SUMMARY
Discharge Summary     Patient: Shaun Bone MRN: [id number]  SSN: [id number]    YOB: 1985  Age: 38 y.o. Sex: female       Admit Date: 10/14/2020    Discharge Date: 10/17/2020      Admission Diagnoses: Severe anemia [D64.9]    Discharge Diagnoses:   Problem List as of 10/17/2020 Never Reviewed          Codes Class Noted - Resolved    Severe anemia ICD-10-CM: D64.9  ICD-9-CM: 285.9  10/14/2020 - Present          Acute on chronic severe iron deficiency anemia  Dizziness  Acute severe anemia  Noncompliance with treatment  Procedure patient had include upper endoscopy    Discharge Condition: Good    Hospital Course: Patient came in with a complaint of dizziness and had a hemoglobin of 2g%. Patient was transfused with multiple packed red blood cells and lab work showed severe iron deficiency anemia. Patient also was given IV iron and was seen by the hematologist.  Patient was not cooperative for treatment and refused further iron treatment. She claimed \"I do not want anymore treatment I would like to go home\".   She has been advised to follow-up with a primary medical physician, hematologist gastroenterologist.  Patient also had upper endoscopy done and to follow-up outpatient colonoscopy with the gastroenterologist    Consults: Gastroenterology    Significant Diagnostic Studies: labs:   Recent Results (from the past 24 hour(s))   HGB & HCT    Collection Time: 10/16/20  3:45 PM   Result Value Ref Range    HGB 9.8 (L) 11.5 - 16.0 g/dL    HCT 31.4 (L) 35.0 - 47.0 %   CBC WITH AUTOMATED DIFF    Collection Time: 10/16/20  8:38 PM   Result Value Ref Range    WBC 5.2 3.6 - 11.0 K/uL    RBC 4.35 3.80 - 5.20 M/uL    HGB 9.4 (L) 11.5 - 16.0 g/dL    HCT 29.7 (L) 35.0 - 47.0 %    MCV 68.3 (L) 80.0 - 99.0 FL    MCH 21.6 (L) 26.0 - 34.0 PG    MCHC 31.6 30.0 - 36.5 g/dL    PLATELET 525 (H) 946 - 400 K/uL    NRBC 0.9 (H) 0  WBC    ABSOLUTE NRBC 0.05 (H) 0.00 - 0.01 K/uL    NEUTROPHILS 80 (H) 32 - 75 % LYMPHOCYTES 14 12 - 49 %    MONOCYTES 6 5 - 13 %    EOSINOPHILS 0 0 - 7 %    BASOPHILS 0 0 - 1 %    IMMATURE GRANULOCYTES 0 0.0 - 0.5 %    ABS. NEUTROPHILS 4.2 1.8 - 8.0 K/UL    ABS. LYMPHOCYTES 0.7 (L) 0.8 - 3.5 K/UL    ABS. MONOCYTES 0.3 0.0 - 1.0 K/UL    ABS. EOSINOPHILS 0.0 0.0 - 0.4 K/UL    ABS. BASOPHILS 0.0 0.0 - 0.1 K/UL    ABS. IMM. GRANS. 0.0 0.00 - 0.04 K/UL    DF AUTOMATED      NRBC 1.0  WBC    ABSOLUTE NRBC 0.05 K/uL    RBC COMMENTS Hypochromia  4+        RBC COMMENTS Anisocytosis  2+        RBC COMMENTS Ovalocytes  1+        RBC COMMENTS Microcytosis  1+        RBC COMMENTS Spherocytes  1+       METABOLIC PANEL, BASIC    Collection Time: 10/16/20  8:38 PM   Result Value Ref Range    Sodium 137 136 - 145 mmol/L    Potassium 3.1 (L) 3.5 - 5.1 mmol/L    Chloride 104 97 - 108 mmol/L    CO2 25 21 - 32 mmol/L    Anion gap 8 5 - 15 mmol/L    Glucose 90 65 - 100 mg/dL    BUN 8 6 - 20 mg/dL    Creatinine 0.69 0.55 - 1.02 mg/dL    BUN/Creatinine ratio 12 12 - 20      GFR est AA >60 >60 ml/min/1.73m2    GFR est non-AA >60 >60 ml/min/1.73m2    Calcium 8.9 8.5 - 10.1 mg/dL   CBC WITH AUTOMATED DIFF    Collection Time: 10/17/20  1:26 AM   Result Value Ref Range    WBC 5.3 3.6 - 11.0 K/uL    RBC 4.14 3.80 - 5.20 M/uL    HGB 9.0 (L) 11.5 - 16.0 g/dL    HCT 28.4 (L) 35.0 - 47.0 %    MCV 68.6 (L) 80.0 - 99.0 FL    MCH 21.7 (L) 26.0 - 34.0 PG    MCHC 31.7 30.0 - 36.5 g/dL    PLATELET 107 172 - 048 K/uL    NEUTROPHILS 67 32 - 75 %    LYMPHOCYTES 22 12 - 49 %    MONOCYTES 9 5 - 13 %    EOSINOPHILS 1 0 - 7 %    BASOPHILS 0 0 - 1 %    IMMATURE GRANULOCYTES 1 (H) 0.0 - 0.5 %    ABS. NEUTROPHILS 3.6 1.8 - 8.0 K/UL    ABS. LYMPHOCYTES 1.2 0.8 - 3.5 K/UL    ABS. MONOCYTES 0.5 0.0 - 1.0 K/UL    ABS. EOSINOPHILS 0.0 0.0 - 0.4 K/UL    ABS. BASOPHILS 0.0 0.0 - 0.1 K/UL    ABS. IMM.  GRANS. 0.0 0.00 - 0.04 K/UL    DF AUTOMATED             No orders to display       Disposition: home    Discharge Medications:   Current Discharge Medication List      START taking these medications    Details   melatonin 3 mg tablet Take 1 Tab by mouth nightly as needed for Insomnia. Qty: 30 Tab, Refills: 0      pantoprazole (PROTONIX) 40 mg tablet Take 1 Tab by mouth two (2) times a day. Qty: 30 Tab, Refills: 0      cyanocobalamin 1,000 mcg tablet Take 1 Tab by mouth daily. Qty: 30 Tab, Refills: 0      nystatin (MYCOSTATIN) topical cream Apply  to affected area two (2) times a day. Qty: 15 g, Refills: 0      folic acid (FOLVITE) 1 mg tablet Take 1 Tab by mouth daily.   Qty: 30 Tab, Refills: 0             Activity: Activity as tolerated  Diet: Regular Diet  Wound Care: None needed    Follow-up Appointments   Procedures    FOLLOW UP VISIT Appointment in: 3 - 5 Days     Standing Status:   Standing     Number of Occurrences:   1     Order Specific Question:   Appointment in     Answer:   3 - 5 Days     Discharge time greater than 35 minutes  Signed By: Papi Galicia MD     October 17, 2020

## 2020-10-17 NOTE — PROGRESS NOTES
Pt Iv removed after c/o tenderness at site- No redness or swelling noted at insertion site- Pt refused for IV insertion in lieu of pending d/c- Nurse told pt about AM iron infusion- pt says whe would discuss with MD in the AM and would allow IV insertion then when needed- -Pt is stable now with no IV needs-

## 2020-10-17 NOTE — PROGRESS NOTES
Progress Note    Patient: Alison Foote MRN: [id number]  SSN: [id number]    YOB: 1985  Age: 38 y.o. Sex: female      Admit Date: 10/14/2020    LOS: 3 days     Subjective:     Patient is comfortable she denies any abdominal pain nausea vomiting or further work bleeding. Her upper endoscopy showed severe reflux biopsies were taken for histology. She denies any fever or chills. Objective:     Vitals:    10/16/20 1705 10/16/20 1950 10/17/20 0319 10/17/20 0745   BP: 124/76 123/72 125/74 127/81   Pulse: 80 71 74 70   Resp: 16 16 16 18   Temp: 98.1 °F (36.7 °C) 98.4 °F (36.9 °C) 98.2 °F (36.8 °C) 98.2 °F (36.8 °C)   SpO2: 100% 100%  100%   Weight:       Height:            Intake and Output:  Current Shift: No intake/output data recorded. Last three shifts: 10/15 1901 - 10/17 0700  In: 814 [I.V.:250]  Out: -     Physical Exam:   Skin:  Extremities and face reveal no rashes. No cuadra erythema. HEENT: Sclerae anicteric. Extra-occular muscles are intact. No abnormal pigmentation of the lips. The neck is supple. Cardiovascular: Regular rate and rhythm. Respiratory:  Comfortable breathing with no accessory muscle use. GI:  Abdomen nondistended, soft, and nontender. No enlargement of the liver or spleen. No masses palpable. Rectal:  Deferred  Musculoskeletal: Extremities have good range of motion. Neurological:  Gross memory appears intact. Patient is alert and oriented. Psychiatric:  Mood appears appropriate with judgement intact.   Lymphatic:  No visible adenopathy      Lab/Data Review:  Recent Results (from the past 24 hour(s))   HGB & HCT    Collection Time: 10/16/20  3:45 PM   Result Value Ref Range    HGB 9.8 (L) 11.5 - 16.0 g/dL    HCT 31.4 (L) 35.0 - 47.0 %   CBC WITH AUTOMATED DIFF    Collection Time: 10/16/20  8:38 PM   Result Value Ref Range    WBC 5.2 3.6 - 11.0 K/uL    RBC 4.35 3.80 - 5.20 M/uL    HGB 9.4 (L) 11.5 - 16.0 g/dL    HCT 29.7 (L) 35.0 - 47.0 %    MCV 68.3 (L) 80.0 - 99.0 FL    MCH 21.6 (L) 26.0 - 34.0 PG    MCHC 31.6 30.0 - 36.5 g/dL    PLATELET 477 (H) 149 - 400 K/uL    NRBC 0.9 (H) 0  WBC    ABSOLUTE NRBC 0.05 (H) 0.00 - 0.01 K/uL    NEUTROPHILS 80 (H) 32 - 75 %    LYMPHOCYTES 14 12 - 49 %    MONOCYTES 6 5 - 13 %    EOSINOPHILS 0 0 - 7 %    BASOPHILS 0 0 - 1 %    IMMATURE GRANULOCYTES 0 0.0 - 0.5 %    ABS. NEUTROPHILS 4.2 1.8 - 8.0 K/UL    ABS. LYMPHOCYTES 0.7 (L) 0.8 - 3.5 K/UL    ABS. MONOCYTES 0.3 0.0 - 1.0 K/UL    ABS. EOSINOPHILS 0.0 0.0 - 0.4 K/UL    ABS. BASOPHILS 0.0 0.0 - 0.1 K/UL    ABS. IMM. GRANS. 0.0 0.00 - 0.04 K/UL    DF AUTOMATED      NRBC 1.0  WBC    ABSOLUTE NRBC 0.05 K/uL    RBC COMMENTS Hypochromia  4+        RBC COMMENTS Anisocytosis  2+        RBC COMMENTS Ovalocytes  1+        RBC COMMENTS Microcytosis  1+        RBC COMMENTS Spherocytes  1+       METABOLIC PANEL, BASIC    Collection Time: 10/16/20  8:38 PM   Result Value Ref Range    Sodium 137 136 - 145 mmol/L    Potassium 3.1 (L) 3.5 - 5.1 mmol/L    Chloride 104 97 - 108 mmol/L    CO2 25 21 - 32 mmol/L    Anion gap 8 5 - 15 mmol/L    Glucose 90 65 - 100 mg/dL    BUN 8 6 - 20 mg/dL    Creatinine 0.69 0.55 - 1.02 mg/dL    BUN/Creatinine ratio 12 12 - 20      GFR est AA >60 >60 ml/min/1.73m2    GFR est non-AA >60 >60 ml/min/1.73m2    Calcium 8.9 8.5 - 10.1 mg/dL   CBC WITH AUTOMATED DIFF    Collection Time: 10/17/20  1:26 AM   Result Value Ref Range    WBC 5.3 3.6 - 11.0 K/uL    RBC 4.14 3.80 - 5.20 M/uL    HGB 9.0 (L) 11.5 - 16.0 g/dL    HCT 28.4 (L) 35.0 - 47.0 %    MCV 68.6 (L) 80.0 - 99.0 FL    MCH 21.7 (L) 26.0 - 34.0 PG    MCHC 31.7 30.0 - 36.5 g/dL    PLATELET 250 815 - 247 K/uL    NEUTROPHILS 67 32 - 75 %    LYMPHOCYTES 22 12 - 49 %    MONOCYTES 9 5 - 13 %    EOSINOPHILS 1 0 - 7 %    BASOPHILS 0 0 - 1 %    IMMATURE GRANULOCYTES 1 (H) 0.0 - 0.5 %    ABS. NEUTROPHILS 3.6 1.8 - 8.0 K/UL    ABS. LYMPHOCYTES 1.2 0.8 - 3.5 K/UL    ABS. MONOCYTES 0.5 0.0 - 1.0 K/UL    ABS.  EOSINOPHILS 0.0 0.0 - 0.4 K/UL    ABS. BASOPHILS 0.0 0.0 - 0.1 K/UL    ABS. IMM. GRANS. 0.0 0.00 - 0.04 K/UL    DF AUTOMATED                No orders to display       Assessment:     Active Problems:    Severe anemia (10/14/2020)    Results of endoscopy were discussed with the patient and proton pump inhibitor therapy was prescribed       Plan:   Iron supplements, proton pump inhibitor therapy Protonix twice daily for 6 weeks and then once a day an outpatient colonoscopy.     Thank you for allowing me to participate in this patients care    Signed By: Kirk Saravia MD     October 17, 2020

## 2020-10-22 NOTE — PROGRESS NOTES
[facility], Please forward to your [place] staff to have them inform patient.

## 2020-10-29 ENCOUNTER — HOSPITAL ENCOUNTER (EMERGENCY)
Age: 35
Discharge: HOME | End: 2020-10-29
Attending: INTERNAL MEDICINE
Payer: MEDICAID

## 2020-10-29 VITALS — BODY MASS INDEX: 25.52 KG/M2 | WEIGHT: 130 LBS | HEIGHT: 60 IN

## 2020-10-29 DIAGNOSIS — T78.40XA ALLERGIC REACTION, INITIAL ENCOUNTER: Primary | ICD-10-CM

## 2020-10-29 PROCEDURE — 99283 EMERGENCY DEPT VISIT LOW MDM: CPT

## 2020-10-29 RX ORDER — PREDNISONE 20 MG/1
20 TABLET ORAL DAILY
Qty: 5 TAB | Refills: 0 | Status: SHIPPED | OUTPATIENT
Start: 2020-10-29 | End: 2020-11-03

## 2020-10-29 RX ORDER — CETIRIZINE HCL 10 MG
10 TABLET ORAL DAILY
Qty: 5 TAB | Refills: 0 | Status: SHIPPED | OUTPATIENT
Start: 2020-10-29 | End: 2020-11-03

## 2020-10-29 NOTE — ED TRIAGE NOTES
Rec'ing first time infusion of Ferumoxytol at [facility]. Reports chest tightness and SOB with infusion. Rec'd 50mg benadryl and 100mg hydrocortisone PTA. Patient denies CP or SOB at triage.

## 2020-10-29 NOTE — ED PROVIDER NOTES
HPI   This patient was getting an iron infusion at the cancer center when she developed shortness of breath and pain in her back. Patient was given antihistamines and steroids and sent to the emergency department. By the time the patient arrived to the ED she was asymptomatic. History reviewed. No pertinent past medical history. History reviewed. No pertinent surgical history. History reviewed. No pertinent family history. Social History     Socioeconomic History    Marital status:      Spouse name: Not on file    Number of children: Not on file    Years of education: Not on file    Highest education level: Not on file   Occupational History    Not on file   Social Needs    Financial resource strain: Not on file    Food insecurity     Worry: Not on file     Inability: Not on file    Transportation needs     Medical: Not on file     Non-medical: Not on file   Tobacco Use    Smoking status: Never Smoker    Smokeless tobacco: Never Used   Substance and Sexual Activity    Alcohol use: Not Currently    Drug use: Never    Sexual activity: Not on file   Lifestyle    Physical activity     Days per week: Not on file     Minutes per session: Not on file    Stress: Not on file   Relationships    Social connections     Talks on phone: Not on file     Gets together: Not on file     Attends Gnosticist service: Not on file     Active member of club or organization: Not on file     Attends meetings of clubs or organizations: Not on file     Relationship status: Not on file    Intimate partner violence     Fear of current or ex partner: Not on file     Emotionally abused: Not on file     Physically abused: Not on file     Forced sexual activity: Not on file   Other Topics Concern    Not on file   Social History Narrative    Not on file         ALLERGIES: Ferumoxytol    Review of Systems  ENT:  No throat swelling  MSK: (+) back pain  Resp: (+) shortness of breath which has resolved. Maxx Josue   No wheezing. GI: No vomiting  Skin:  No urticaria noted. All other systems reviewed are negative except as documented in the HPI    Exam  General:Well developed, well nourished patient in NAD  Skin/Derm: Skin is warm and dry. No urticarial lesions. Ophth: Bulbar conjunctivae are not injected. ENT: No stridor. Pulm: No tachypnea or retractions. No wheezing. Neuro:  Alert. Psych: Affect is normal.  Not anxious. Vitals:    10/29/20 1325   Weight: 59 kg (130 lb)   Height: 5' (1.524 m)            ------------------------------  Nurses Notes  Reviewed    ------------------------------  Diagnosis  Allergic reaction to iron infusion  ------------------------------  Disposition  Discharge  ------------------------------  Plan  Zyrtec 10mg orally once a day. Prednisone 20mg orally once a day x 5 days.   ------------------------------

## 2020-11-10 ENCOUNTER — TRANSCRIBE ORDER (OUTPATIENT)
Dept: SCHEDULING | Age: 35
End: 2020-11-10

## 2020-11-10 DIAGNOSIS — R10.32 LLQ PAIN: Primary | ICD-10-CM

## 2020-12-01 ENCOUNTER — TRANSCRIBE ORDER (OUTPATIENT)
Dept: SCHEDULING | Age: 35
End: 2020-12-01

## 2020-12-01 DIAGNOSIS — D50.9 IRON DEFICIENCY ANEMIA, UNSPECIFIED: Primary | ICD-10-CM

## 2020-12-01 DIAGNOSIS — R10.814 ABDOMINAL TENDERNESS, LEFT LOWER QUADRANT: ICD-10-CM

## 2020-12-01 DIAGNOSIS — R68.89 MECHANICAL AND MOTOR PROBLEMS WITH INTERNAL ORGANS: ICD-10-CM

## 2021-03-19 ENCOUNTER — HOSPITAL ENCOUNTER (OUTPATIENT)
Dept: PREADMISSION TESTING | Age: 36
Discharge: HOME | End: 2021-03-19
Payer: MEDICAID

## 2021-03-19 LAB — SARS-COV-2, COV2: NORMAL

## 2021-03-19 PROCEDURE — U0003 INFECTIOUS AGENT DETECTION BY NUCLEIC ACID (DNA OR RNA); SEVERE ACUTE RESPIRATORY SYNDROME CORONAVIRUS 2 (SARS-COV-2) (CORONAVIRUS DISEASE [COVID-19]), AMPLIFIED PROBE TECHNIQUE, MAKING USE OF HIGH THROUGHPUT TECHNOLOGIES AS DESCRIBED BY CMS-2020-01-R: HCPCS

## 2021-03-20 LAB — SARS-COV-2, COV2NT: NOT DETECTED

## 2022-03-19 PROBLEM — D64.9 SEVERE ANEMIA: Status: ACTIVE | Noted: 2020-10-14

## 2023-05-14 RX ORDER — PANTOPRAZOLE SODIUM 40 MG/1
40 TABLET, DELAYED RELEASE ORAL 2 TIMES DAILY
COMMUNITY
Start: 2020-10-17

## 2023-05-14 RX ORDER — LANOLIN ALCOHOL/MO/W.PET/CERES
3 CREAM (GRAM) TOPICAL
COMMUNITY
Start: 2020-10-17

## 2023-05-14 RX ORDER — FOLIC ACID 1 MG/1
1 TABLET ORAL DAILY
COMMUNITY
Start: 2020-10-17

## 2023-05-14 RX ORDER — NYSTATIN 100000 U/G
CREAM TOPICAL 2 TIMES DAILY
COMMUNITY
Start: 2020-10-17

## 2023-05-17 ENCOUNTER — HOSPITAL ENCOUNTER (EMERGENCY)
Facility: HOSPITAL | Age: 38
Discharge: HOME | End: 2023-05-18
Attending: STUDENT IN AN ORGANIZED HEALTH CARE EDUCATION/TRAINING PROGRAM
Payer: MEDICAID

## 2023-05-17 DIAGNOSIS — R00.2 PALPITATIONS: ICD-10-CM

## 2023-05-17 DIAGNOSIS — I10 PRIMARY HYPERTENSION: Primary | ICD-10-CM

## 2023-05-17 LAB
ANION GAP SERPL CALC-SCNC: 6 MMOL/L (ref 5–15)
BASOPHILS # BLD: 0 K/UL (ref 0–0.1)
BASOPHILS NFR BLD: 0 % (ref 0–1)
BUN SERPL-MCNC: 6 MG/DL (ref 6–20)
BUN/CREAT SERPL: 15 (ref 12–20)
CA-I BLD-MCNC: 8.8 MG/DL (ref 8.5–10.1)
CHLORIDE SERPL-SCNC: 102 MMOL/L (ref 97–108)
CO2 SERPL-SCNC: 26 MMOL/L (ref 21–32)
CREAT SERPL-MCNC: 0.41 MG/DL (ref 0.55–1.02)
DIFFERENTIAL METHOD BLD: ABNORMAL
EOSINOPHIL # BLD: 0 K/UL (ref 0–0.4)
EOSINOPHIL NFR BLD: 1 % (ref 0–7)
ERYTHROCYTE [DISTWIDTH] IN BLOOD BY AUTOMATED COUNT: 17.7 % (ref 11.5–14.5)
GLUCOSE SERPL-MCNC: 104 MG/DL (ref 65–100)
HCT VFR BLD AUTO: 33.9 % (ref 35–47)
HGB BLD-MCNC: 10.9 G/DL (ref 11.5–16)
IMM GRANULOCYTES # BLD AUTO: 0 K/UL (ref 0–0.04)
IMM GRANULOCYTES NFR BLD AUTO: 0 % (ref 0–0.5)
LYMPHOCYTES # BLD: 1.7 K/UL (ref 0.8–3.5)
LYMPHOCYTES NFR BLD: 29 % (ref 12–49)
MCH RBC QN AUTO: 21.7 PG (ref 26–34)
MCHC RBC AUTO-ENTMCNC: 32.2 G/DL (ref 30–36.5)
MCV RBC AUTO: 67.5 FL (ref 80–99)
MONOCYTES # BLD: 0.4 K/UL (ref 0–1)
MONOCYTES NFR BLD: 7 % (ref 5–13)
NEUTS SEG # BLD: 3.8 K/UL (ref 1.8–8)
NEUTS SEG NFR BLD: 63 % (ref 32–75)
PLATELET # BLD AUTO: 335 K/UL (ref 150–400)
POTASSIUM SERPL-SCNC: ABNORMAL MMOL/L (ref 3.5–5.1)
RBC # BLD AUTO: 5.02 M/UL (ref 3.8–5.2)
SODIUM SERPL-SCNC: 134 MMOL/L (ref 136–145)
TROPONIN I SERPL HS-MCNC: 17 NG/L (ref 0–51)
WBC # BLD AUTO: 5.9 K/UL (ref 3.6–11)

## 2023-05-17 PROCEDURE — 84484 ASSAY OF TROPONIN QUANT: CPT

## 2023-05-17 PROCEDURE — 99284 EMERGENCY DEPT VISIT MOD MDM: CPT

## 2023-05-17 PROCEDURE — 36415 COLL VENOUS BLD VENIPUNCTURE: CPT

## 2023-05-17 PROCEDURE — 85025 COMPLETE CBC W/AUTO DIFF WBC: CPT

## 2023-05-17 PROCEDURE — 93005 ELECTROCARDIOGRAM TRACING: CPT | Performed by: STUDENT IN AN ORGANIZED HEALTH CARE EDUCATION/TRAINING PROGRAM

## 2023-05-17 PROCEDURE — 6370000000 HC RX 637 (ALT 250 FOR IP): Performed by: STUDENT IN AN ORGANIZED HEALTH CARE EDUCATION/TRAINING PROGRAM

## 2023-05-17 PROCEDURE — 80048 BASIC METABOLIC PNL TOTAL CA: CPT

## 2023-05-17 RX ORDER — FERROUS SULFATE 325(65) MG
325 TABLET ORAL
COMMUNITY

## 2023-05-17 RX ORDER — ACETAMINOPHEN 500 MG
1000 TABLET ORAL
Status: COMPLETED | OUTPATIENT
Start: 2023-05-17 | End: 2023-05-17

## 2023-05-17 RX ORDER — AMLODIPINE BESYLATE 5 MG/1
5 TABLET ORAL DAILY
COMMUNITY

## 2023-05-17 RX ADMIN — ACETAMINOPHEN 1000 MG: 500 TABLET ORAL at 23:18

## 2023-05-17 ASSESSMENT — PAIN - FUNCTIONAL ASSESSMENT: PAIN_FUNCTIONAL_ASSESSMENT: 0-10

## 2023-05-17 ASSESSMENT — LIFESTYLE VARIABLES
HOW OFTEN DO YOU HAVE A DRINK CONTAINING ALCOHOL: NEVER
HOW MANY STANDARD DRINKS CONTAINING ALCOHOL DO YOU HAVE ON A TYPICAL DAY: PATIENT DOES NOT DRINK

## 2023-05-17 ASSESSMENT — PAIN DESCRIPTION - LOCATION: LOCATION: HEAD

## 2023-05-17 ASSESSMENT — PAIN SCALES - GENERAL: PAINLEVEL_OUTOF10: 4

## 2023-05-17 ASSESSMENT — PAIN DESCRIPTION - DESCRIPTORS: DESCRIPTORS: ACHING

## 2023-05-18 VITALS
DIASTOLIC BLOOD PRESSURE: 107 MMHG | OXYGEN SATURATION: 99 % | RESPIRATION RATE: 15 BRPM | HEIGHT: 58 IN | WEIGHT: 170 LBS | TEMPERATURE: 98.5 F | SYSTOLIC BLOOD PRESSURE: 143 MMHG | HEART RATE: 79 BPM | BODY MASS INDEX: 35.68 KG/M2

## 2023-05-18 LAB — TROPONIN I SERPL HS-MCNC: 17 NG/L (ref 0–51)

## 2023-05-18 PROCEDURE — 84484 ASSAY OF TROPONIN QUANT: CPT

## 2023-05-18 PROCEDURE — 36415 COLL VENOUS BLD VENIPUNCTURE: CPT

## 2023-05-18 ASSESSMENT — PAIN - FUNCTIONAL ASSESSMENT: PAIN_FUNCTIONAL_ASSESSMENT: NONE - DENIES PAIN

## 2023-05-18 ASSESSMENT — HEART SCORE: ECG: 0

## 2023-05-18 NOTE — ED TRIAGE NOTES
Noted to be hypertensive at work today. Went home, took amlodipine (first dose of the day). Took a nap and was still hypertensive. Took 2nd dose of amlodipine around 2100.

## 2023-05-18 NOTE — DISCHARGE INSTRUCTIONS
Thank you! Thank you for allowing me to care for you in the emergency department. I sincerely hope that you are satisfied with your visit today. It is my goal to provide you with excellent care. Below you will find a list of your labs and imaging from your visit today if applicable. Should you have any questions regarding these results please do not hesitate to call the emergency department. Please review [employer] for a more detailed result list since the below list may not be comprehensive. Instructions on how to sign up to [employer] should be provided in this packet. Labs -     Labs Reviewed   CBC WITH AUTO DIFFERENTIAL - Abnormal; Notable for the following components:       Result Value    Hemoglobin 10.9 (*)     Hematocrit 33.9 (*)     MCV 67.5 (*)     MCH 21.7 (*)     RDW 17.7 (*)     All other components within normal limits   BASIC METABOLIC PANEL - Abnormal; Notable for the following components:    Sodium 134 (*)     Glucose 104 (*)     Creatinine 0.41 (*)     All other components within normal limits   TROPONIN   TROPONIN   TROPONIN       Radiologic Studies -   No orders to display          If you feel that you have not received excellent quality care or timely care, please ask to speak to the nurse manager. Please choose us in the future for your continued health care needs. ------------------------------------------------------------------------------------------------------------  The exam and treatment you received in the Emergency Department were for an urgent problem and are not intended as complete care. It is important that you follow-up with a doctor, nurse practitioner, or physician assistant to:  (1) confirm your diagnosis,  (2) re-evaluation of changes in your illness and treatment, and  (3) for ongoing care. If your symptoms become worse or you do not improve as expected and you are unable to reach your usual health care provider, you should return to the Emergency Department.  We

## 2023-05-18 NOTE — ED PROVIDER NOTES
orders to display     Medications ordered:  Medications   acetaminophen (TYLENOL) tablet 1,000 mg (1,000 mg Oral Given 5/17/23 5316)       Documentation Comments   - I am the first and primary provider for this patient and am the primary provider of record. - Initial assessment performed. The patients presenting problems have been discussed, and the staff are in agreement with the care plan formulated and outlined with them. I have encouraged them to ask questions as they arise throughout their visit. - Available medical records, nursing notes, old EKGs, and EMS run sheets (if patient was EMS transported) were reviewed    Please note that this dictation was completed with [employer], the [employer] voice recognition software. Quite often unanticipated grammatical, syntax, homophones, and other interpretive errors are inadvertently transcribed by the computer software. Please disregard these errors. Please excuse any errors that have escaped final proofreading.      Mia Ceron MD  05/18/23 1053

## 2023-05-19 LAB
EKG ATRIAL RATE: 95 BPM
EKG DIAGNOSIS: NORMAL
EKG P AXIS: 60 DEGREES
EKG P-R INTERVAL: 202 MS
EKG Q-T INTERVAL: 338 MS
EKG QRS DURATION: 74 MS
EKG QTC CALCULATION (BAZETT): 424 MS
EKG R AXIS: 47 DEGREES
EKG T AXIS: 37 DEGREES
EKG VENTRICULAR RATE: 95 BPM

## (undated) DEVICE — CANNULA NSL O2 AD 7 FT END-TIDAL CARBON DIOX VENTFLO

## (undated) DEVICE — FCPS RMFG RAD JAW 4LC 240CM -- LAWSON OEM ITEM 185493

## (undated) DEVICE — THE BITE BLOCK INTERMEDIATE, LATEX FREE STRAP IS USED TO PROTECT THE ENDOSCOPE INSERTION TUBE FROM BEING BITTEN BY THE PATIENT.

## (undated) DEVICE — THE ENDO CARRY-ON PROCEDURE KIT CONTAINS ALL OF THE SUPPLIES AND INFECTION PREVENTION PRODUCTS NEEDED FOR ENDOSCOPIC PROCEDURES: Brand: ENDO CARRY-ON PROCEDURE KIT